# Patient Record
Sex: FEMALE | Race: WHITE | NOT HISPANIC OR LATINO | Employment: UNEMPLOYED | URBAN - METROPOLITAN AREA
[De-identification: names, ages, dates, MRNs, and addresses within clinical notes are randomized per-mention and may not be internally consistent; named-entity substitution may affect disease eponyms.]

---

## 2017-01-05 ENCOUNTER — HOSPITAL ENCOUNTER (EMERGENCY)
Facility: HOSPITAL | Age: 22
Discharge: HOME | End: 2017-01-05
Admitting: EMERGENCY MEDICINE
Payer: COMMERCIAL

## 2017-01-05 VITALS
WEIGHT: 126 LBS | SYSTOLIC BLOOD PRESSURE: 146 MMHG | DIASTOLIC BLOOD PRESSURE: 94 MMHG | TEMPERATURE: 98.6 F | OXYGEN SATURATION: 100 % | RESPIRATION RATE: 16 BRPM | HEART RATE: 100 BPM

## 2017-01-05 DIAGNOSIS — R30.0 DYSURIA: ICD-10-CM

## 2017-01-05 DIAGNOSIS — R42 DIZZINESS: ICD-10-CM

## 2017-01-05 DIAGNOSIS — N39.0 URINARY TRACT INFECTION: Primary | ICD-10-CM

## 2017-01-05 LAB
ANION GAP SERPL CALCULATED.3IONS-SCNC: 9 MMOL/L (ref 4–13)
BACTERIA UR QL AUTO: ABNORMAL /HPF
BASOPHILS # BLD AUTO: 0.02 THOUSANDS/ΜL (ref 0–0.1)
BASOPHILS NFR BLD AUTO: 0 % (ref 0–1)
BILIRUB UR QL STRIP: ABNORMAL
BUN SERPL-MCNC: 12 MG/DL (ref 5–25)
CALCIUM SERPL-MCNC: 9.1 MG/DL (ref 8.3–10.1)
CHLORIDE SERPL-SCNC: 102 MMOL/L (ref 100–108)
CLARITY UR: ABNORMAL
CO2 SERPL-SCNC: 29 MMOL/L (ref 21–32)
COLOR UR: ABNORMAL
CREAT SERPL-MCNC: 0.84 MG/DL (ref 0.6–1.3)
EOSINOPHIL # BLD AUTO: 0.05 THOUSAND/ΜL (ref 0–0.61)
EOSINOPHIL NFR BLD AUTO: 1 % (ref 0–6)
ERYTHROCYTE [DISTWIDTH] IN BLOOD BY AUTOMATED COUNT: 16.2 % (ref 11.6–15.1)
GFR SERPL CREATININE-BSD FRML MDRD: >60 ML/MIN/1.73SQ M
GLUCOSE SERPL-MCNC: 91 MG/DL (ref 65–140)
GLUCOSE UR STRIP-MCNC: ABNORMAL MG/DL
HCG UR QL: NEGATIVE
HCT VFR BLD AUTO: 33.2 % (ref 34.8–46.1)
HGB BLD-MCNC: 10.8 G/DL (ref 11.5–15.4)
HGB UR QL STRIP.AUTO: ABNORMAL
KETONES UR STRIP-MCNC: ABNORMAL MG/DL
LEUKOCYTE ESTERASE UR QL STRIP: ABNORMAL
LYMPHOCYTES # BLD AUTO: 1.33 THOUSANDS/ΜL (ref 0.6–4.47)
LYMPHOCYTES NFR BLD AUTO: 27 % (ref 14–44)
MCH RBC QN AUTO: 24.8 PG (ref 26.8–34.3)
MCHC RBC AUTO-ENTMCNC: 32.5 G/DL (ref 31.4–37.4)
MCV RBC AUTO: 76 FL (ref 82–98)
MONOCYTES # BLD AUTO: 0.54 THOUSAND/ΜL (ref 0.17–1.22)
MONOCYTES NFR BLD AUTO: 11 % (ref 4–12)
NEUTROPHILS # BLD AUTO: 2.98 THOUSANDS/ΜL (ref 1.85–7.62)
NEUTS SEG NFR BLD AUTO: 61 % (ref 43–75)
NITRITE UR QL STRIP: POSITIVE
NON-SQ EPI CELLS URNS QL MICRO: ABNORMAL /HPF
NRBC BLD AUTO-RTO: 0 /100 WBCS
PH UR STRIP.AUTO: 5 [PH] (ref 4.5–8)
PLATELET # BLD AUTO: 319 THOUSANDS/UL (ref 149–390)
PMV BLD AUTO: 10.6 FL (ref 8.9–12.7)
POTASSIUM SERPL-SCNC: 3.8 MMOL/L (ref 3.5–5.3)
PROT UR STRIP-MCNC: >=300 MG/DL
RBC # BLD AUTO: 4.35 MILLION/UL (ref 3.81–5.12)
RBC #/AREA URNS AUTO: ABNORMAL /HPF
SODIUM SERPL-SCNC: 140 MMOL/L (ref 136–145)
SP GR UR STRIP.AUTO: >=1.03 (ref 1–1.03)
UROBILINOGEN UR QL STRIP.AUTO: >=8 E.U./DL
WBC # BLD AUTO: 4.92 THOUSAND/UL (ref 4.31–10.16)
WBC #/AREA URNS AUTO: ABNORMAL /HPF

## 2017-01-05 PROCEDURE — 36415 COLL VENOUS BLD VENIPUNCTURE: CPT | Performed by: PHYSICIAN ASSISTANT

## 2017-01-05 PROCEDURE — 81025 URINE PREGNANCY TEST: CPT | Performed by: PHYSICIAN ASSISTANT

## 2017-01-05 PROCEDURE — 96361 HYDRATE IV INFUSION ADD-ON: CPT

## 2017-01-05 PROCEDURE — 81001 URINALYSIS AUTO W/SCOPE: CPT | Performed by: PHYSICIAN ASSISTANT

## 2017-01-05 PROCEDURE — 87077 CULTURE AEROBIC IDENTIFY: CPT | Performed by: PHYSICIAN ASSISTANT

## 2017-01-05 PROCEDURE — 96374 THER/PROPH/DIAG INJ IV PUSH: CPT

## 2017-01-05 PROCEDURE — 87086 URINE CULTURE/COLONY COUNT: CPT | Performed by: PHYSICIAN ASSISTANT

## 2017-01-05 PROCEDURE — 80048 BASIC METABOLIC PNL TOTAL CA: CPT | Performed by: PHYSICIAN ASSISTANT

## 2017-01-05 PROCEDURE — 85025 COMPLETE CBC W/AUTO DIFF WBC: CPT | Performed by: PHYSICIAN ASSISTANT

## 2017-01-05 PROCEDURE — 87186 SC STD MICRODIL/AGAR DIL: CPT | Performed by: PHYSICIAN ASSISTANT

## 2017-01-05 PROCEDURE — 99283 EMERGENCY DEPT VISIT LOW MDM: CPT

## 2017-01-05 RX ORDER — ONDANSETRON 2 MG/ML
4 INJECTION INTRAMUSCULAR; INTRAVENOUS ONCE
Status: COMPLETED | OUTPATIENT
Start: 2017-01-05 | End: 2017-01-05

## 2017-01-05 RX ORDER — CEPHALEXIN 500 MG/1
500 CAPSULE ORAL 2 TIMES DAILY
Qty: 14 CAPSULE | Refills: 0 | Status: SHIPPED | OUTPATIENT
Start: 2017-01-05 | End: 2017-01-12

## 2017-01-05 RX ADMIN — SODIUM CHLORIDE 1000 ML: 0.9 INJECTION, SOLUTION INTRAVENOUS at 18:28

## 2017-01-05 RX ADMIN — ONDANSETRON 4 MG: 2 INJECTION INTRAMUSCULAR; INTRAVENOUS at 18:28

## 2017-01-07 ENCOUNTER — HOSPITAL ENCOUNTER (EMERGENCY)
Facility: HOSPITAL | Age: 22
Discharge: HOME | End: 2017-01-07
Attending: EMERGENCY MEDICINE | Admitting: EMERGENCY MEDICINE
Payer: COMMERCIAL

## 2017-01-07 VITALS
TEMPERATURE: 98.7 F | HEART RATE: 103 BPM | RESPIRATION RATE: 18 BRPM | OXYGEN SATURATION: 100 % | DIASTOLIC BLOOD PRESSURE: 56 MMHG | SYSTOLIC BLOOD PRESSURE: 98 MMHG | WEIGHT: 125 LBS

## 2017-01-07 DIAGNOSIS — K52.9 GASTROENTERITIS: Primary | ICD-10-CM

## 2017-01-07 LAB
ANION GAP SERPL CALCULATED.3IONS-SCNC: 11 MMOL/L (ref 4–13)
BACTERIA UR CULT: NORMAL
BASOPHILS # BLD MANUAL: 0 THOUSAND/UL (ref 0–0.1)
BASOPHILS NFR MAR MANUAL: 0 % (ref 0–1)
BUN SERPL-MCNC: 8 MG/DL (ref 5–25)
CALCIUM SERPL-MCNC: 8.9 MG/DL (ref 8.3–10.1)
CHLORIDE SERPL-SCNC: 104 MMOL/L (ref 100–108)
CO2 SERPL-SCNC: 24 MMOL/L (ref 21–32)
CREAT SERPL-MCNC: 0.64 MG/DL (ref 0.6–1.3)
EOSINOPHIL # BLD MANUAL: 0 THOUSAND/UL (ref 0–0.4)
EOSINOPHIL NFR BLD MANUAL: 0 % (ref 0–6)
ERYTHROCYTE [DISTWIDTH] IN BLOOD BY AUTOMATED COUNT: 16.2 % (ref 11.6–15.1)
GFR SERPL CREATININE-BSD FRML MDRD: >60 ML/MIN/1.73SQ M
GLUCOSE SERPL-MCNC: 136 MG/DL (ref 65–140)
HCT VFR BLD AUTO: 31.3 % (ref 34.8–46.1)
HGB BLD-MCNC: 10.1 G/DL (ref 11.5–15.4)
HYPERCHROMIA BLD QL SMEAR: PRESENT
LYMPHOCYTES # BLD AUTO: 0.21 THOUSAND/UL (ref 0.6–4.47)
LYMPHOCYTES # BLD AUTO: 2 % (ref 14–44)
MAGNESIUM SERPL-MCNC: 1.2 MG/DL (ref 1.6–2.6)
MCH RBC QN AUTO: 24.5 PG (ref 26.8–34.3)
MCHC RBC AUTO-ENTMCNC: 32.3 G/DL (ref 31.4–37.4)
MCV RBC AUTO: 76 FL (ref 82–98)
MICROCYTES BLD QL AUTO: PRESENT
MONOCYTES # BLD AUTO: 0.21 THOUSAND/UL (ref 0–1.22)
MONOCYTES NFR BLD: 2 % (ref 4–12)
NEUTROPHILS # BLD MANUAL: 9.92 THOUSAND/UL (ref 1.85–7.62)
NEUTS BAND NFR BLD MANUAL: 5 % (ref 0–8)
NEUTS SEG NFR BLD AUTO: 91 % (ref 43–75)
NRBC BLD AUTO-RTO: 0 /100 WBCS
OVALOCYTES BLD QL SMEAR: PRESENT
PLATELET # BLD AUTO: 238 THOUSANDS/UL (ref 149–390)
PLATELET BLD QL SMEAR: ADEQUATE
PMV BLD AUTO: 10.6 FL (ref 8.9–12.7)
POTASSIUM SERPL-SCNC: 3.8 MMOL/L (ref 3.5–5.3)
RBC # BLD AUTO: 4.12 MILLION/UL (ref 3.81–5.12)
SODIUM SERPL-SCNC: 139 MMOL/L (ref 136–145)
TOTAL CELLS COUNTED SPEC: 100
WBC # BLD AUTO: 10.33 THOUSAND/UL (ref 4.31–10.16)

## 2017-01-07 PROCEDURE — 36415 COLL VENOUS BLD VENIPUNCTURE: CPT | Performed by: EMERGENCY MEDICINE

## 2017-01-07 PROCEDURE — 96375 TX/PRO/DX INJ NEW DRUG ADDON: CPT

## 2017-01-07 PROCEDURE — 99283 EMERGENCY DEPT VISIT LOW MDM: CPT

## 2017-01-07 PROCEDURE — 85007 BL SMEAR W/DIFF WBC COUNT: CPT | Performed by: EMERGENCY MEDICINE

## 2017-01-07 PROCEDURE — 96361 HYDRATE IV INFUSION ADD-ON: CPT

## 2017-01-07 PROCEDURE — 85027 COMPLETE CBC AUTOMATED: CPT | Performed by: EMERGENCY MEDICINE

## 2017-01-07 PROCEDURE — 83735 ASSAY OF MAGNESIUM: CPT | Performed by: EMERGENCY MEDICINE

## 2017-01-07 PROCEDURE — 96365 THER/PROPH/DIAG IV INF INIT: CPT

## 2017-01-07 PROCEDURE — 80048 BASIC METABOLIC PNL TOTAL CA: CPT | Performed by: EMERGENCY MEDICINE

## 2017-01-07 RX ORDER — METOCLOPRAMIDE 10 MG/1
10 TABLET ORAL EVERY 6 HOURS
Qty: 120 TABLET | Refills: 0 | Status: SHIPPED | OUTPATIENT
Start: 2017-01-07 | End: 2017-02-06

## 2017-01-07 RX ORDER — METOCLOPRAMIDE HYDROCHLORIDE 5 MG/ML
10 INJECTION INTRAMUSCULAR; INTRAVENOUS ONCE
Status: DISCONTINUED | OUTPATIENT
Start: 2017-01-07 | End: 2017-01-07 | Stop reason: HOSPADM

## 2017-01-07 RX ORDER — ONDANSETRON 4 MG/1
4 TABLET, ORALLY DISINTEGRATING ORAL ONCE
Status: COMPLETED | OUTPATIENT
Start: 2017-01-07 | End: 2017-01-07

## 2017-01-07 RX ORDER — ONDANSETRON 4 MG/1
4 TABLET, ORALLY DISINTEGRATING ORAL EVERY 8 HOURS PRN
Qty: 20 TABLET | Refills: 0 | Status: SHIPPED | OUTPATIENT
Start: 2017-01-07 | End: 2018-08-16

## 2017-01-07 RX ORDER — ONDANSETRON 2 MG/ML
4 INJECTION INTRAMUSCULAR; INTRAVENOUS ONCE
Status: COMPLETED | OUTPATIENT
Start: 2017-01-07 | End: 2017-01-07

## 2017-01-07 RX ADMIN — SODIUM CHLORIDE 1000 ML: 0.9 INJECTION, SOLUTION INTRAVENOUS at 02:52

## 2017-01-07 RX ADMIN — MAGNESIUM SULFATE HEPTAHYDRATE 1 G: 1 INJECTION, SOLUTION INTRAVENOUS at 04:21

## 2017-01-07 RX ADMIN — ONDANSETRON 4 MG: 4 TABLET, ORALLY DISINTEGRATING ORAL at 01:41

## 2017-01-07 RX ADMIN — ONDANSETRON 4 MG: 2 INJECTION INTRAMUSCULAR; INTRAVENOUS at 02:52

## 2017-05-01 ENCOUNTER — ALLSCRIPTS OFFICE VISIT (OUTPATIENT)
Dept: OTHER | Facility: OTHER | Age: 22
End: 2017-05-01

## 2017-05-01 ENCOUNTER — HOSPITAL ENCOUNTER (EMERGENCY)
Facility: HOSPITAL | Age: 22
Discharge: HOME | End: 2017-05-01
Attending: EMERGENCY MEDICINE | Admitting: EMERGENCY MEDICINE
Payer: COMMERCIAL

## 2017-05-01 VITALS
SYSTOLIC BLOOD PRESSURE: 115 MMHG | WEIGHT: 125 LBS | OXYGEN SATURATION: 100 % | RESPIRATION RATE: 16 BRPM | DIASTOLIC BLOOD PRESSURE: 65 MMHG | HEART RATE: 96 BPM | TEMPERATURE: 98 F

## 2017-05-01 DIAGNOSIS — F41.9 ANXIETY: ICD-10-CM

## 2017-05-01 DIAGNOSIS — R31.29 OTHER MICROSCOPIC HEMATURIA: ICD-10-CM

## 2017-05-01 DIAGNOSIS — R42 DIZZINESS AND GIDDINESS: ICD-10-CM

## 2017-05-01 DIAGNOSIS — F41.8 OTHER SPECIFIED ANXIETY DISORDERS: ICD-10-CM

## 2017-05-01 DIAGNOSIS — R00.2 PALPITATIONS: Primary | ICD-10-CM

## 2017-05-01 DIAGNOSIS — R31.29 MICROSCOPIC HEMATURIA: ICD-10-CM

## 2017-05-01 DIAGNOSIS — Z71.6 TOBACCO ABUSE COUNSELING: ICD-10-CM

## 2017-05-01 LAB
ATRIAL RATE: 83 BPM
BACTERIA UR QL AUTO: ABNORMAL /HPF
BILIRUB UR QL STRIP: NEGATIVE
CLARITY UR: CLEAR
COLOR UR: YELLOW
GLUCOSE SERPL-MCNC: 99 MG/DL (ref 65–140)
GLUCOSE UR STRIP-MCNC: NEGATIVE MG/DL
HCG UR QL: NEGATIVE
HGB UR QL STRIP.AUTO: ABNORMAL
KETONES UR STRIP-MCNC: NEGATIVE MG/DL
LEUKOCYTE ESTERASE UR QL STRIP: NEGATIVE
NITRITE UR QL STRIP: NEGATIVE
NON-SQ EPI CELLS URNS QL MICRO: ABNORMAL /HPF
P AXIS: 61 DEGREES
PH UR STRIP.AUTO: 6 [PH] (ref 4.5–8)
PR INTERVAL: 132 MS
PROT UR STRIP-MCNC: NEGATIVE MG/DL
QRS AXIS: 59 DEGREES
QRSD INTERVAL: 78 MS
QT INTERVAL: 340 MS
QTC INTERVAL: 399 MS
RBC #/AREA URNS AUTO: ABNORMAL /HPF
SP GR UR STRIP.AUTO: 1.01 (ref 1–1.03)
T WAVE AXIS: 56 DEGREES
UROBILINOGEN UR QL STRIP.AUTO: 0.2 E.U./DL
VENTRICULAR RATE: 83 BPM
WBC #/AREA URNS AUTO: ABNORMAL /HPF

## 2017-05-01 PROCEDURE — 82948 REAGENT STRIP/BLOOD GLUCOSE: CPT

## 2017-05-01 PROCEDURE — 87086 URINE CULTURE/COLONY COUNT: CPT

## 2017-05-01 PROCEDURE — 81025 URINE PREGNANCY TEST: CPT | Performed by: EMERGENCY MEDICINE

## 2017-05-01 PROCEDURE — 81001 URINALYSIS AUTO W/SCOPE: CPT

## 2017-05-01 PROCEDURE — 81002 URINALYSIS NONAUTO W/O SCOPE: CPT | Performed by: EMERGENCY MEDICINE

## 2017-05-01 PROCEDURE — 93005 ELECTROCARDIOGRAM TRACING: CPT | Performed by: EMERGENCY MEDICINE

## 2017-05-01 PROCEDURE — 87186 SC STD MICRODIL/AGAR DIL: CPT

## 2017-05-01 PROCEDURE — 99285 EMERGENCY DEPT VISIT HI MDM: CPT

## 2017-05-03 LAB
BACTERIA UR CULT: NORMAL
BACTERIA UR CULT: NORMAL

## 2017-05-08 ENCOUNTER — GENERIC CONVERSION - ENCOUNTER (OUTPATIENT)
Dept: OTHER | Facility: OTHER | Age: 22
End: 2017-05-08

## 2017-05-08 ENCOUNTER — HOSPITAL ENCOUNTER (OUTPATIENT)
Dept: ULTRASOUND IMAGING | Facility: HOSPITAL | Age: 22
Discharge: HOME | End: 2017-05-08
Payer: COMMERCIAL

## 2017-05-08 ENCOUNTER — APPOINTMENT (OUTPATIENT)
Dept: LAB | Facility: HOSPITAL | Age: 22
End: 2017-05-08
Payer: COMMERCIAL

## 2017-05-08 DIAGNOSIS — R42 DIZZINESS AND GIDDINESS: ICD-10-CM

## 2017-05-08 DIAGNOSIS — F41.8 OTHER SPECIFIED ANXIETY DISORDERS: ICD-10-CM

## 2017-05-08 DIAGNOSIS — R31.29 OTHER MICROSCOPIC HEMATURIA: ICD-10-CM

## 2017-05-08 LAB
ALBUMIN SERPL BCP-MCNC: 4.5 G/DL (ref 3.5–5)
ALP SERPL-CCNC: 72 U/L (ref 46–116)
ALT SERPL W P-5'-P-CCNC: 14 U/L (ref 12–78)
ANION GAP SERPL CALCULATED.3IONS-SCNC: 12 MMOL/L (ref 4–13)
AST SERPL W P-5'-P-CCNC: 12 U/L (ref 5–45)
BASOPHILS # BLD AUTO: 0.04 THOUSANDS/ΜL (ref 0–0.1)
BASOPHILS NFR BLD AUTO: 0 % (ref 0–1)
BILIRUB SERPL-MCNC: 0.54 MG/DL (ref 0.2–1)
BUN SERPL-MCNC: 5 MG/DL (ref 5–25)
CALCIUM SERPL-MCNC: 9.3 MG/DL (ref 8.3–10.1)
CHLORIDE SERPL-SCNC: 100 MMOL/L (ref 100–108)
CO2 SERPL-SCNC: 25 MMOL/L (ref 21–32)
CREAT SERPL-MCNC: 0.8 MG/DL (ref 0.6–1.3)
EOSINOPHIL # BLD AUTO: 0.09 THOUSAND/ΜL (ref 0–0.61)
EOSINOPHIL NFR BLD AUTO: 1 % (ref 0–6)
ERYTHROCYTE [DISTWIDTH] IN BLOOD BY AUTOMATED COUNT: 17.6 % (ref 11.6–15.1)
GFR SERPL CREATININE-BSD FRML MDRD: >60 ML/MIN/1.73SQ M
GLUCOSE SERPL-MCNC: 94 MG/DL (ref 65–140)
HCT VFR BLD AUTO: 34.7 % (ref 34.8–46.1)
HGB BLD-MCNC: 10.7 G/DL (ref 11.5–15.4)
LYMPHOCYTES # BLD AUTO: 2.85 THOUSANDS/ΜL (ref 0.6–4.47)
LYMPHOCYTES NFR BLD AUTO: 28 % (ref 14–44)
MCH RBC QN AUTO: 22.2 PG (ref 26.8–34.3)
MCHC RBC AUTO-ENTMCNC: 30.8 G/DL (ref 31.4–37.4)
MCV RBC AUTO: 72 FL (ref 82–98)
MONOCYTES # BLD AUTO: 0.85 THOUSAND/ΜL (ref 0.17–1.22)
MONOCYTES NFR BLD AUTO: 8 % (ref 4–12)
NEUTROPHILS # BLD AUTO: 6.27 THOUSANDS/ΜL (ref 1.85–7.62)
NEUTS SEG NFR BLD AUTO: 63 % (ref 43–75)
NRBC BLD AUTO-RTO: 0 /100 WBCS
PLATELET # BLD AUTO: 363 THOUSANDS/UL (ref 149–390)
PMV BLD AUTO: 11.3 FL (ref 8.9–12.7)
POTASSIUM SERPL-SCNC: 3.2 MMOL/L (ref 3.5–5.3)
PROT SERPL-MCNC: 7.8 G/DL (ref 6.4–8.2)
RBC # BLD AUTO: 4.83 MILLION/UL (ref 3.81–5.12)
SODIUM SERPL-SCNC: 137 MMOL/L (ref 136–145)
TSH SERPL DL<=0.05 MIU/L-ACNC: 4.88 UIU/ML (ref 0.36–3.74)
WBC # BLD AUTO: 10.1 THOUSAND/UL (ref 4.31–10.16)

## 2017-05-08 PROCEDURE — 85025 COMPLETE CBC W/AUTO DIFF WBC: CPT

## 2017-05-08 PROCEDURE — 76770 US EXAM ABDO BACK WALL COMP: CPT

## 2017-05-08 PROCEDURE — 80053 COMPREHEN METABOLIC PANEL: CPT

## 2017-05-08 PROCEDURE — 84443 ASSAY THYROID STIM HORMONE: CPT

## 2017-05-08 PROCEDURE — 36415 COLL VENOUS BLD VENIPUNCTURE: CPT

## 2017-05-09 ENCOUNTER — GENERIC CONVERSION - ENCOUNTER (OUTPATIENT)
Dept: OTHER | Facility: OTHER | Age: 22
End: 2017-05-09

## 2017-05-16 ENCOUNTER — HOSPITAL ENCOUNTER (EMERGENCY)
Facility: HOSPITAL | Age: 22
Discharge: HOME | End: 2017-05-16
Attending: EMERGENCY MEDICINE
Payer: COMMERCIAL

## 2017-05-16 VITALS
OXYGEN SATURATION: 99 % | TEMPERATURE: 98.7 F | RESPIRATION RATE: 16 BRPM | WEIGHT: 118 LBS | HEART RATE: 87 BPM | DIASTOLIC BLOOD PRESSURE: 69 MMHG | SYSTOLIC BLOOD PRESSURE: 115 MMHG

## 2017-05-16 DIAGNOSIS — F41.9 ANXIETY: ICD-10-CM

## 2017-05-16 DIAGNOSIS — R55 NEAR SYNCOPE: Primary | ICD-10-CM

## 2017-05-16 LAB
ATRIAL RATE: 61 BPM
BACTERIA UR QL AUTO: ABNORMAL /HPF
BILIRUB UR QL STRIP: NEGATIVE
CLARITY UR: CLEAR
COLOR UR: YELLOW
GLUCOSE UR STRIP-MCNC: NEGATIVE MG/DL
HCG UR QL: NEGATIVE
HGB UR QL STRIP.AUTO: ABNORMAL
KETONES UR STRIP-MCNC: NEGATIVE MG/DL
LEUKOCYTE ESTERASE UR QL STRIP: NEGATIVE
MUCOUS THREADS UR QL AUTO: ABNORMAL
NITRITE UR QL STRIP: NEGATIVE
NON-SQ EPI CELLS URNS QL MICRO: ABNORMAL /HPF
P AXIS: 31 DEGREES
PH UR STRIP.AUTO: 7.5 [PH] (ref 4.5–8)
PR INTERVAL: 132 MS
PROT UR STRIP-MCNC: NEGATIVE MG/DL
QRS AXIS: 61 DEGREES
QRSD INTERVAL: 90 MS
QT INTERVAL: 402 MS
QTC INTERVAL: 404 MS
RBC #/AREA URNS AUTO: ABNORMAL /HPF
SP GR UR STRIP.AUTO: 1.01 (ref 1–1.03)
T WAVE AXIS: 56 DEGREES
UROBILINOGEN UR QL STRIP.AUTO: 0.2 E.U./DL
VENTRICULAR RATE: 61 BPM
WBC #/AREA URNS AUTO: ABNORMAL /HPF

## 2017-05-16 PROCEDURE — 93005 ELECTROCARDIOGRAM TRACING: CPT | Performed by: EMERGENCY MEDICINE

## 2017-05-16 PROCEDURE — 81001 URINALYSIS AUTO W/SCOPE: CPT

## 2017-05-16 PROCEDURE — 81025 URINE PREGNANCY TEST: CPT | Performed by: EMERGENCY MEDICINE

## 2017-05-16 PROCEDURE — 99283 EMERGENCY DEPT VISIT LOW MDM: CPT

## 2017-05-16 PROCEDURE — 81002 URINALYSIS NONAUTO W/O SCOPE: CPT | Performed by: EMERGENCY MEDICINE

## 2017-06-05 ENCOUNTER — ALLSCRIPTS OFFICE VISIT (OUTPATIENT)
Dept: OTHER | Facility: OTHER | Age: 22
End: 2017-06-05

## 2017-06-05 DIAGNOSIS — R79.89 OTHER SPECIFIED ABNORMAL FINDINGS OF BLOOD CHEMISTRY: ICD-10-CM

## 2017-06-05 DIAGNOSIS — D64.9 ANEMIA: ICD-10-CM

## 2017-06-05 LAB
BILIRUB UR QL STRIP: NORMAL
CLARITY UR: NORMAL
COLOR UR: CLEAR
GLUCOSE (HISTORICAL): NORMAL
HGB UR QL STRIP.AUTO: NORMAL
KETONES UR STRIP-MCNC: NORMAL MG/DL
LEUKOCYTE ESTERASE UR QL STRIP: NORMAL
NITRITE UR QL STRIP: NORMAL
PH UR STRIP.AUTO: 6 [PH]
PROT UR STRIP-MCNC: NORMAL MG/DL
SP GR UR STRIP.AUTO: 1.01
UROBILINOGEN UR QL STRIP.AUTO: 0.2

## 2017-06-07 LAB — CULTURE RESULT (HISTORICAL): NORMAL

## 2017-06-16 ENCOUNTER — ALLSCRIPTS OFFICE VISIT (OUTPATIENT)
Dept: OTHER | Facility: OTHER | Age: 22
End: 2017-06-16

## 2017-07-06 ENCOUNTER — ALLSCRIPTS OFFICE VISIT (OUTPATIENT)
Dept: OTHER | Facility: OTHER | Age: 22
End: 2017-07-06

## 2017-07-09 ENCOUNTER — GENERIC CONVERSION - ENCOUNTER (OUTPATIENT)
Dept: OTHER | Facility: OTHER | Age: 22
End: 2017-07-09

## 2017-09-15 ENCOUNTER — ALLSCRIPTS OFFICE VISIT (OUTPATIENT)
Dept: OTHER | Facility: OTHER | Age: 22
End: 2017-09-15

## 2017-09-15 ENCOUNTER — TRANSCRIBE ORDERS (OUTPATIENT)
Dept: ADMINISTRATIVE | Facility: HOSPITAL | Age: 22
End: 2017-09-15

## 2017-09-15 DIAGNOSIS — H53.122 TRANSIENT VISUAL LOSS OF LEFT EYE: ICD-10-CM

## 2017-09-15 DIAGNOSIS — D64.9 ANEMIA: ICD-10-CM

## 2017-09-15 DIAGNOSIS — G44.52 NEW DAILY PERSISTENT HEADACHE: Primary | ICD-10-CM

## 2017-09-15 DIAGNOSIS — R94.6 ABNORMAL RESULTS OF THYROID FUNCTION STUDIES: ICD-10-CM

## 2017-09-15 DIAGNOSIS — G44.52 NEW DAILY PERSISTENT HEADACHE (NDPH): ICD-10-CM

## 2017-09-26 ENCOUNTER — APPOINTMENT (OUTPATIENT)
Dept: LAB | Facility: HOSPITAL | Age: 22
End: 2017-09-26
Payer: COMMERCIAL

## 2017-09-26 ENCOUNTER — GENERIC CONVERSION - ENCOUNTER (OUTPATIENT)
Dept: OTHER | Facility: OTHER | Age: 22
End: 2017-09-26

## 2017-09-26 ENCOUNTER — HOSPITAL ENCOUNTER (OUTPATIENT)
Dept: MRI IMAGING | Facility: HOSPITAL | Age: 22
Discharge: HOME | End: 2017-09-26
Payer: COMMERCIAL

## 2017-09-26 DIAGNOSIS — H53.122 TRANSIENT VISUAL LOSS OF LEFT EYE: ICD-10-CM

## 2017-09-26 DIAGNOSIS — G44.52 NEW DAILY PERSISTENT HEADACHE: ICD-10-CM

## 2017-09-26 DIAGNOSIS — G44.52 NEW DAILY PERSISTENT HEADACHE (NDPH): ICD-10-CM

## 2017-09-26 DIAGNOSIS — D64.9 ANEMIA: ICD-10-CM

## 2017-09-26 DIAGNOSIS — R94.6 ABNORMAL RESULTS OF THYROID FUNCTION STUDIES: ICD-10-CM

## 2017-09-26 LAB
ALBUMIN SERPL BCP-MCNC: 4.1 G/DL (ref 3.5–5)
ALP SERPL-CCNC: 84 U/L (ref 46–116)
ALT SERPL W P-5'-P-CCNC: 41 U/L (ref 12–78)
ANION GAP SERPL CALCULATED.3IONS-SCNC: 5 MMOL/L (ref 4–13)
AST SERPL W P-5'-P-CCNC: 18 U/L (ref 5–45)
BILIRUB SERPL-MCNC: 0.13 MG/DL (ref 0.2–1)
BUN SERPL-MCNC: 11 MG/DL (ref 5–25)
CALCIUM SERPL-MCNC: 9.2 MG/DL (ref 8.3–10.1)
CHLORIDE SERPL-SCNC: 103 MMOL/L (ref 100–108)
CO2 SERPL-SCNC: 31 MMOL/L (ref 21–32)
CREAT SERPL-MCNC: 0.75 MG/DL (ref 0.6–1.3)
CRP SERPL QL: <3 MG/L
ERYTHROCYTE [SEDIMENTATION RATE] IN BLOOD: 4 MM/HOUR (ref 0–20)
FERRITIN SERPL-MCNC: 4 NG/ML (ref 8–388)
FOLATE SERPL-MCNC: >20 NG/ML (ref 3.1–17.5)
GFR SERPL CREATININE-BSD FRML MDRD: 114 ML/MIN/1.73SQ M
GLUCOSE P FAST SERPL-MCNC: 98 MG/DL (ref 65–99)
IRON SERPL-MCNC: 24 UG/DL (ref 50–170)
POTASSIUM SERPL-SCNC: 4 MMOL/L (ref 3.5–5.3)
PROT SERPL-MCNC: 7.6 G/DL (ref 6.4–8.2)
SODIUM SERPL-SCNC: 139 MMOL/L (ref 136–145)
T4 SERPL-MCNC: 10 UG/DL (ref 4.7–13.3)
TRANSFERRIN SERPL-MCNC: 325 MG/DL (ref 200–400)
TSH SERPL DL<=0.05 MIU/L-ACNC: 6.44 UIU/ML (ref 0.36–3.74)
VIT B12 SERPL-MCNC: 545 PG/ML (ref 100–900)

## 2017-09-26 PROCEDURE — 84466 ASSAY OF TRANSFERRIN: CPT

## 2017-09-26 PROCEDURE — 82746 ASSAY OF FOLIC ACID SERUM: CPT

## 2017-09-26 PROCEDURE — 80053 COMPREHEN METABOLIC PANEL: CPT

## 2017-09-26 PROCEDURE — 36415 COLL VENOUS BLD VENIPUNCTURE: CPT

## 2017-09-26 PROCEDURE — 84443 ASSAY THYROID STIM HORMONE: CPT

## 2017-09-26 PROCEDURE — 85652 RBC SED RATE AUTOMATED: CPT

## 2017-09-26 PROCEDURE — 70551 MRI BRAIN STEM W/O DYE: CPT

## 2017-09-26 PROCEDURE — 83540 ASSAY OF IRON: CPT

## 2017-09-26 PROCEDURE — 82607 VITAMIN B-12: CPT

## 2017-09-26 PROCEDURE — 86140 C-REACTIVE PROTEIN: CPT

## 2017-09-26 PROCEDURE — 84436 ASSAY OF TOTAL THYROXINE: CPT

## 2017-09-26 PROCEDURE — 82728 ASSAY OF FERRITIN: CPT

## 2017-09-27 ENCOUNTER — GENERIC CONVERSION - ENCOUNTER (OUTPATIENT)
Dept: OTHER | Facility: OTHER | Age: 22
End: 2017-09-27

## 2017-10-09 ENCOUNTER — ALLSCRIPTS OFFICE VISIT (OUTPATIENT)
Dept: OTHER | Facility: OTHER | Age: 22
End: 2017-10-09

## 2017-10-10 NOTE — PROGRESS NOTES
Assessment  1  New daily persistent headache (339 42) (G44 52)   2  Hypothyroid (244 9) (E03 9)   3  Anemia (285 9) (D64 9)   4  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)    Plan  Anemia    · Ferrous Sulfate 325 (65 Fe) MG Oral Tablet; Take 1 tablet twice daily   · Call [phone] if: You have nausea and vomiting that lasts longer than 8 hours ;  Status:Complete;   Done: 55TDW2499 02:52PM   · Call [phone] if: You see any blood in the stool ; Status:Complete;   Done:  32QMT9749 02:52PM   · Call [phone] if: Your bowel movements are hard, difficult to push out, or if several  days have gone by without a bowel movement ; Status:Complete;   Done: 47LEV6808  02:52PM   · Call [phone] if: Your skin, lips, or fingernails appear pale ; Status:Complete;    Done: 56AQQ1351 02:52PM   · Call 911 if: You have pain in your chest ; Status:Complete;   Done: 73MVG8629 02:52PM   · Do not take aspirin or anti-inflammatory medicines ; Status:Complete;   Done:  63ZDM5819 02:52PM   · Eat a diet high in iron ; Status:Complete;   Done: 43PYA6583 02:52PM  Hypothyroid    · Levothyroxine Sodium 50 MCG Oral Tablet; TAKE 1 TABLET DAILY   · (1) T4, FREE; Status:Active; Requested for:50Kcl2717;    · (1) TSH; Status:Active; Requested for:81Hyv7005;    · Call [phone] if: The symptoms are not better in 7 days ; Status:Complete;   Done:  64IVK6279 02:52PM   · Call [phone] if: You gain or lose over 10 pounds without a change in your diet ;  Status:Complete;   Done: 71EES4185 02:52PM   · Call [phone] if: You have symptoms of anxiety ; Status:Complete;   Done:  12DAP0690 02:52PM   · Call [phone] if: You lose weight without trying to ; Status:Complete;   Done:  15ZFO9272 02:52PM   · Call 911 if: You are having trouble staying awake ; Status:Complete;   Done: 05FSV3876  02:52PM   · Call 911 if:  You experience a new kind of chest pain (angina) or pressure ;  Status:Complete;   Done: 79CHC1482 02:52PM   · Call 911 if: You experience a seizure ; Status:Complete;   Done: 87EYM9832 02:52PM   · Call 911 if: You have fainted or passed out ; Status:Complete;   Done: 04YLW2507  02:52PM   · Seek Immediate Medical Attention if: You are feeling short of breath ; Status:Complete;    Done: 41EKU6865 02:52PM   · Seek Immediate Medical Attention if: You feel your heart is beating very fast or skipping  beats ; Status:Complete;   Done: 73TFW1364 02:52PM   · Seek Immediate Medical Attention if: You have signs of too much thyroid hormone ;  Status:Complete;   Done: 52SHR4759 02:52PM   · Begin a limited exercise program ; Status:Complete;   Done: 82OWM8200 02:52PM   · Eat no more than 30 grams of fat per day ; Status:Complete;   Done: 98KXD5195 02:52PM   · Keep a diary of when and what you eat ; Status:Complete;   Done: 71UUE0948 02:52PM   · Some eating tips that can help you lose weight ; Status:Complete;   Done: 90HPM1229  02:52PM   · We recommend that you bring your body mass index down to 26 ; Status:Complete;    Done: 87YTJ0102 02:52PM  Ophthalmic migraine    · Avoid activities that cause or worsen the pain ; Status:Complete;   Done: 44MVH0146  02:52PM   · Avoid exposure to cigarette smoke ; Status:Complete;   Done: 96RDB2560 02:52PM   · Call [phone] if: The symptoms seem worse ; Status:Complete;   Done:  03JWQ8339 02:52PM   · Call [phone] if: You become dizzy or lightheaded, especially when you stand up  after sitting for awhile ; Status:Complete;   Done: 30CEI0891 02:52PM   · Call [phone] if: You get a headache that does not go away with your usual  treatment ; Status:Complete;   Done: 14AJL5459 02:52PM   · Call [phone] if: Your headaches lead to vomiting ; Status:Complete;   Done:  51UDS0288 02:52PM   · Call 911 if:  You develop double vision (see two of everything) ; Status:Complete;   Done:  43QZZ6099 02:52PM   · Call 911 if: You have any symptoms of a stroke ; Status:Complete;   Done: 25MVB3494  02:52PM   · Seek Immediate Medical Attention if: You have a severe headache that will not go away ;  Status:Complete;   Done: 74ZIG5944 02:52PM   · Seek Immediate Medical Attention if: You lose your vision for a short period of time ;  Status:Complete;   Done: 49MUF0082 02:52PM   · Seek Immediate Medical Attention if: Your temperature is greater than 103F ;  Status:Complete;   Done: 38IKG2099 02:52PM  Unlinked    · Rizatriptan Benzoate 10 MG Oral Tablet Disintegrating; TAKE 1 TABLET AT ONSET  OF HEADACHE  MAY REPEAT EVERY 2 HOURS AS NEEDED  MAXIMUM 3 TABLETS IN  24 HOURS    I will see her after she gets her lab work done in 11 weeks  She is to call the office if symptoms do not improve or if they get worse  We will recheck her iron and CBC in 3 months  Chief Complaint  follow up headaches  review blood work,[facility]      History of Present Illness  Patient is a 19-year-old female presented to the office for follow-up after her headaches and to review recent MRI and lab work  She is only had 2 bad optic migraines since her last visit, the butalbital did help somewhat but not completely  She does continue to have almost daily headaches  These are different than the optic migraines  The patient is being seen for follow-up of migraine headache and chronic daily headache  The patient reports Migraines improved, still getting daily headaches  Interval symptoms:  denies nausea,-denies vomiting,-denies photophobia,-denies phonophobia,-denies paresthesias,-denies localized weakness,-improved aura-and-improved scotoma  Associated symptoms: vertigo,-lightheadedness-and-Patient describes her daily headache as a throbbing that is generalized  The ophthalmic headache is more left-sided, deep, aching , but-no nasal congestion-and-no neck pain  Active Problems  1  Anemia (285 9) (D64 9)   2  Anxiety associated with depression (300 4) (F41 8)   3  New daily persistent headache (339 42) (G44 52)   4  Transient visual loss of left eye (368 12) (H53 122)    Past Medical History  1  History of Abnormal thyroid stimulating hormone (TSH) level (790 6) (R94 6)   2  History of Acute tonsillitis, unspecified etiology (463) (J03 90)   3  History of Anxiety (300 00) (F41 9)   4  History of Dysphagia, oropharyngeal phase (787 22) (R13 12)   5  History of depression (V11 8) (Z86 59)   6  History of dysuria (V13 00) (Z87 898)   7  History of fatigue (V13 89) (Z87 898)   8  History of hematuria (V13 09) (Z87 448)   9  History of urinary frequency (V13 09) (Z87 898)   10  History of Insect bite, multiple (919 4,E906 4) (W57 XXXA)   11  History of Microscopic hematuria (599 72) (R31 29)   12  History of Yeast vaginitis (112 1) (B37 3)    Family History  Mother    1  Family history of alcoholism (V17 0) (Z81 1)   2  Family history of hepatic cirrhosis (V18 59) (Z83 79)  Maternal Grandfather    3  Family history of alcoholism (V17 0) (Z81 1)   4  Family history of hepatic cirrhosis (V18 59) (Z83 79)    The family history was reviewed and updated today  Social History   · Always uses seat belt   · Current every day smoker (305 1) (F17 200)   · Denied: History of Drug Use   · Moderate alcohol use  The social history was reviewed and updated today  The social history was reviewed and is unchanged  Current Meds   1  Butalbital-APAP-Caffeine -40 MG Oral Capsule; 1 tablet every 4-6 hrs as needed   for migraine; Therapy: 93Tgo5058 to (Last Rx:20Fco1238)  Requested for: 30Mok4921 Ordered   2  Multiple Vitamins Oral Tablet; Take 1 daily; Therapy: 78Xck9409 to (Last Rx:99Xfr4050) Ordered    The medication list was reviewed and updated today  Allergies  1   No Known Drug Allergies    Vitals  Vital Signs    Recorded: 88QXY1720 50:07CP   Systolic 073   Diastolic 70   Height 5 ft    Weight 115 lb    BMI Calculated 22 46   BSA Calculated 1 48     Physical Exam    Constitutional   General appearance: No acute distress, well appearing and well nourished  Eyes   Conjunctiva and lids: No swelling, erythema or discharge  Pupils and irises: Equal, round and reactive to light  Ears, Nose, Mouth, and Throat   External inspection of ears and nose: Normal     Otoscopic examination: Tympanic membranes translucent with normal light reflex  Canals patent without erythema  Nasal mucosa, septum, and turbinates: Normal without edema or erythema  Oropharynx: Normal with no erythema, edema, exudate or lesions  Pulmonary   Respiratory effort: No increased work of breathing or signs of respiratory distress  Auscultation of lungs: Clear to auscultation  Cardiovascular   Palpation of heart: Normal PMI, no thrills  Auscultation of heart: Normal rate and rhythm, normal S1 and S2, without murmurs  Examination of extremities for edema and/or varicosities: Normal     Carotid pulses: Normal     Abdomen   Abdomen: Non-tender, no masses  Liver and spleen: No hepatomegaly or splenomegaly  Lymphatic   Palpation of lymph nodes in neck: No lymphadenopathy  Musculoskeletal   Gait and station: Normal     Digits and nails: Normal without clubbing or cyanosis  Inspection/palpation of joints, bones, and muscles: Normal     Skin   Skin and subcutaneous tissue: Normal without rashes or lesions  Neurologic   Cranial nerves: Cranial nerves 2-12 intact  Reflexes: 2+ and symmetric  Sensation: No sensory loss      Psychiatric   Orientation to person, place, and time: Normal     Mood and affect: Normal          Results/Data  (1) COMPREHENSIVE METABOLIC PANEL 07NFZ5836 93:59PW Veronica Vergara Order Number: [id number]     Test Name Result Flag Reference   SODIUM 139 mmol/L  136-145   POTASSIUM 4 0 mmol/L  3 5-5 3   CHLORIDE 103 mmol/L  100-108   CARBON DIOXIDE 31 mmol/L  21-32   ANION GAP (CALC) 5 mmol/L  4-13   BLOOD UREA NITROGEN 11 mg/dL  5-25   CREATININE 0 75 mg/dL  0 60-1 30   Standardized to [facility] reference method   CALCIUM 9 2 mg/dL  8 3-10 1   BILI, TOTAL 0 13 mg/dL L 0 20-1 00   ALK PHOSPHATAS 84 U/L     ALT (SGPT) 41 U/L  12-78   Specimen collection should occur prior to Sulfasalazine administration due to the potential for falsely depressed results  AST(SGOT) 18 U/L  5-45   Specimen collection should occur prior to Sulfasalazine administration due to the potential for falsely depressed results  ALBUMIN 4 1 g/dL  3 5-5 0   TOTAL PROTEIN 7 6 g/dL  6 4-8 2   eGFR 114 ml/min/1 73sq m     [facility] Disease Education Program recommendations are as follows:  GFR calculation is accurate only with a steady state creatinine  Chronic Kidney disease less than 60 ml/min/1 73 sq  meters  Kidney failure less than 15 ml/min/1 73 sq  meters  GLUCOSE FASTING 98 mg/dL  65-99   Specimen collection should occur prior to Sulfasalazine administration due to the potential for falsely depressed results  Specimen collection should occur prior to Sulfapyridine administration due to the potential for falsely elevated results  (1) C-REACTIVE PROTEIN 85BGS6941 08:36AM [employer]    Order Number: [id number]     Test Name Result Flag Reference   C-REACT PROTEIN <3 0 mg/L  <3 0     (1) SED RATE 84IXR8774 08:36AM [facility] Order Number: [id number]     Test Name Result Flag Reference   SED RATE 4 mm/hour  0-20     (1) TSH 59IEM9242 08:36AM [employer]    Order Number: [id number]     Test Name Result Flag Reference   TSH 6 436 uIU/mL H 0 358-3 740   Patients undergoing fluorescein dye angiography may retain small amounts of fluorescein in the body for 48-72 hours post procedure  Samples containing fluorescein can produce falsely depressed TSH values  If the patient had this procedure,a specimen should be resubmitted post fluorescein clearance            The recommended reference ranges for TSH during pregnancy are as follows:  First trimester 0 1 to 2 5 uIU/mL  Second trimester  0 2 to 3 0 uIU/mL  Third trimester 0 3 to 3 0 uIU/m     (1) THYROXINE 45Qlp0503 08:36AM [employer] [place] Order Number: [id number]     Test Name Result Flag Reference   T4 TOTAL 10 0 ug/dL  4 7-13 3     (1) FERRITIN 29HFT5506 08:36AM [employer] [place] Order Number: [id number]     Test Name Result Flag Reference   FERRITIN 4 ng/mL L 8-388     (1) TRANSFERRIN 53YJK1122 08:36AM [employer] [place] Order Number: [id number]     Test Name Result Flag Reference   TRANSFERRIN 325 mg/dL  200-400     (1) IRON 30EUF7427 08:36AM [employer] [place] Order Number: [id number]     Test Name Result Flag Reference   IRON 24 ug/dL L    Patients treated with metal-binding drugs (ie  Deferoxamine) may have depressed iron values  (1) VITAMIN B12 26Sep2017 08:36AM [employer] [place] Order Number: [id number]     Test Name Result Flag Reference   VITAMIN B12 545 pg/mL  100-900     (1) FOLATE 83ISC6573 08:36AM [employer] [place] Order Number: [id number]     Test Name Result Flag Reference   FOLATE >20 0 ng/mL H 3 1-17 5     * MRI BRAIN WO CONTRAST 83APN1854 07:49AM Lalito Phillips     Test Name Result Flag Reference   MRI BRAIN WO CONTRAST (Report)     MRI BRAIN WITHOUT CONTRAST     INDICATION: Migraines, 1-2 months     COMPARISON:  None  TECHNIQUE: Sagittal T1, axial T2, axial FLAIR, axial T1, axial Gradient and axial diffusion imaging  IMAGE QUALITY: Diagnostic  FINDINGS:     BRAIN PARENCHYMA: No acute disease  There is no acute ischemia, intracranial mass, mass effect or edema  No indication of pathologic hemosiderin deposition  Tiny nonspecific focus of high signal in the subinsular parenchyma on the left  These have been described in patients with complicated migraine  VENTRICLES: The ventricles are normal in size and contour       SELLA AND PITUITARY GLAND: Normal      ORBITS: Normal      PARANASAL SINUSES: Normal      VASCULATURE: Evaluation of the major intracranial vasculature demonstrates appropriate flow voids  CALVARIUM AND SKULL BASE: Normal      EXTRACRANIAL SOFT TISSUES: Normal        IMPRESSION:     No acute disease  No mass or edema  Tiny, nonspecific solitary focus of high signal subinsular white matter  Workstation performed: [id number]     Signed by: Yaya Longo MD   9/26/17     Health Management  Current every day smoker   *VB - Smoking and Tobacco Cessation Counseling 3-10 minutes; every 1 year; Next Due:  64RMK0704; Overdue    Future Appointments    Date/Time Provider Specialty Site   11/10/2017 07:00 AM Prosper Pelletier MD Neurology [place] [address]   10/16/2017 02:15 PM CHAGO Hinton   Urology [facility] [place]     Signatures   Electronically signed by : Roxana Deluna DO; Oct  9 2017  2:53PM EST                       (Author)

## 2017-10-16 ENCOUNTER — ALLSCRIPTS OFFICE VISIT (OUTPATIENT)
Dept: OTHER | Facility: OTHER | Age: 22
End: 2017-10-16

## 2017-10-16 ENCOUNTER — APPOINTMENT (OUTPATIENT)
Dept: LAB | Facility: HOSPITAL | Age: 22
End: 2017-10-16
Attending: UROLOGY
Payer: COMMERCIAL

## 2017-10-16 DIAGNOSIS — R31.29 OTHER MICROSCOPIC HEMATURIA: ICD-10-CM

## 2017-10-16 DIAGNOSIS — N39.0 URINARY TRACT INFECTION: ICD-10-CM

## 2017-10-16 LAB
BILIRUB UR QL STRIP: NEGATIVE
BILIRUB UR QL STRIP: NORMAL
CLARITY UR: CLEAR
CLARITY UR: NORMAL
COLOR UR: YELLOW
COLOR UR: YELLOW
GLUCOSE (HISTORICAL): NORMAL
GLUCOSE UR STRIP-MCNC: NEGATIVE MG/DL
HGB UR QL STRIP.AUTO: NEGATIVE
HGB UR QL STRIP.AUTO: NORMAL
KETONES UR STRIP-MCNC: NEGATIVE MG/DL
KETONES UR STRIP-MCNC: NORMAL MG/DL
LEUKOCYTE ESTERASE UR QL STRIP: NEGATIVE
LEUKOCYTE ESTERASE UR QL STRIP: NORMAL
NITRITE UR QL STRIP: NEGATIVE
NITRITE UR QL STRIP: NORMAL
PH UR STRIP.AUTO: 5 [PH]
PH UR STRIP.AUTO: 6 [PH] (ref 4.5–8)
PROT UR STRIP-MCNC: NEGATIVE MG/DL
PROT UR STRIP-MCNC: NORMAL MG/DL
SP GR UR STRIP.AUTO: 1.02
SP GR UR STRIP.AUTO: 1.02 (ref 1–1.03)
UROBILINOGEN UR QL STRIP.AUTO: 0.2 E.U./DL
UROBILINOGEN UR QL STRIP.AUTO: NORMAL

## 2017-10-16 PROCEDURE — 81003 URINALYSIS AUTO W/O SCOPE: CPT

## 2017-10-16 PROCEDURE — 87086 URINE CULTURE/COLONY COUNT: CPT

## 2017-10-17 ENCOUNTER — ALLSCRIPTS OFFICE VISIT (OUTPATIENT)
Dept: OTHER | Facility: OTHER | Age: 22
End: 2017-10-17

## 2017-10-17 LAB — BACTERIA UR CULT: NORMAL

## 2017-10-17 NOTE — CONSULTS
Assessment  1  Recurrent UTI (599 0) (N39 0)   2  Microscopic hematuria (599 72) (R31 29)   3  History of Thyroid trouble (246 9) (E07 9)   4  History of Mental health problem (V40 9) (F48 9)   5  Family history of hypertension (V17 49) (Z82 49) : Father   6  Never a smoker   7  No alcohol use   8  Non-smoker (V49 89) (Z78 9)    Plan   Microscopic hematuria, Recurrent UTI    · Urine Dip Non-Automated- POC; Status:Complete - Retrospective By Protocol  Authorization;   Done: 54FDQ2411 02:17PM   Performed: In Office; Due:16Oct2018; Last Updated By:Juanito Jose; 10/16/2017 2:19:20 PM;Ordered; For:Microscopic hematuria, Recurrent UTI; Ordered By:Scotty Kramer;   · (1) URINALYSIS (will reflex a microscopy if leukocytes, occult blood, protein or nitrites are  not within normal limits); Status:Active - Retrospective By Protocol Authorization; Requested UVC:[id number];    [facility]; Due:16Oct2018; Last Updated By:Juanito Jose; 10/16/2017 3:06:18 PM;Ordered; For:Microscopic hematuria, Recurrent UTI; Ordered By:Scotty Kramer;   · (1) URINE CULTURE; Source:Urine, Clean Catch; Status:Active - Retrospective By  Protocol Authorization; Requested GUP:[id number];    [facility]form[facility]; Due:16Oct2018; Last Updated By:Juanito Jose; 10/16/2017 3:06:18 PM;Ordered; For:Microscopic hematuria, Recurrent UTI; Ordered By:Scotty Kramer;  Recurrent UTI    · Follow-up visit in 1 month Evaluation and Treatment  Follow-up  Status: Hold For -  Scheduling  Requested for: 38KDY8156   Ordered; For: Recurrent UTI; Ordered By: Vinh Goldstein Performed:  Due: 13ZAA4043    Measure Post Void Residual - POC; Status:Active - Perform Order; Requested WWI:63MSD3577;   Perform: In Office; Due:16Oct2018; Ordered; For:Recurrent UTI; Ordered By:Dolores Kramer [place]; Discussion/Summary  Discussion Summary:   We discussed her symptoms and recent infections   I would like to check urinalysis and culture to evaluate her status now  We also talked about increasing her fluid intake with water and decreasing her iced tea intake  I recommended GYN evaluation as she has not had 1 in some time  She will follow up in about 1 month to evaluate her progress  She should notify us if she has any further symptoms we will treat according to her cultures  Will also check a postvoid residual at her next visit  Goals and Barriers: The patent has the current Barriers: None  Medication SE Review and Pt Understands Tx: The treatment plan was reviewed with the patient/guardian  The patient/guardian understands and agrees with the treatment plan      Chief Complaint  Chief Complaint Free Text Note Form: Patient presents for recurrent UTIs, microhematuria      History of Present Illness  HPI: Patient is a 76-year-old woman complaining of recurrent urinary tract infections  She reports burning and urgency of urination if she is not well hydrated which lasts for a few days  She notes that with increasing hydration her symptoms improved  She has also been treated with antibiotics in the past  There are urine cultures positive for E coli  She mostly drinks iced tea and some soda and coffee  Not much water  is sexually active and does void afterwards  She does not think sexual activity is related to her infections  Review of Systems  Complete-Female Urology:   Constitutional: No fever, no chills, feels well, no tiredness, no recent weight gain or weight loss  Respiratory: No complaints of shortness of breath, no wheezing, no cough, no SOB on exertion, no orthopnea, no PND  Cardiovascular: No complaints of slow heart rate, no fast heart rate, no chest pain, no palpitations, no leg claudication, no lower extremity edema  Gastrointestinal: No complaints of abdominal pain, no constipation, no nausea or vomiting, no diarrhea, no bloody stools     Genitourinary: dysuria,-- feelings of urinary urgency-- (Soemtimes ),-- Empty sensation-- and-- stream quality good, but-- as noted in HPI,-- no urinary hesitancy-- and-- no hematuria--    The patient presents with complaints of nocturia (Sometimes )  Musculoskeletal: No complaints of arthralgias, no myalgias, no joint swelling or stiffness, no limb pain or swelling  Integumentary: No complaints of skin rash or lesions, no itching, no skin wounds, no breast pain or lump  Hematologic/Lymphatic: No complaints of swollen glands, no swollen glands in the neck, does not bleed easily, does not bruise easily  Neurological: No complaints of headache, no confusion, no convulsions, no numbness, no dizziness or fainting, no tingling, no limb weakness, no difficulty walking  ROS Reviewed:   ROS reviewed  Active Problems  1  Anemia (285 9) (D64 9)   2  Anxiety associated with depression (300 4) (F41 8)   3  Hypothyroid (244 9) (E03 9)   4  Microscopic hematuria (599 72) (R31 29)   5  New daily persistent headache (339 42) (G44 52)   6  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)   7  Recurrent UTI (599 0) (N39 0)   8  Transient visual loss of left eye (368 12) (H53 122)    Past Medical History  1  History of Abnormal thyroid stimulating hormone (TSH) level (790 6) (R94 6)   2  History of Acute tonsillitis, unspecified etiology (463) (J03 90)   3  History of Anxiety (300 00) (F41 9)   4  History of Dysphagia, oropharyngeal phase (787 22) (R13 12)   5  History of depression (V11 8) (Z86 59)   6  History of dysuria (V13 00) (Z87 898)   7  History of fatigue (V13 89) (Z87 898)   8  History of hematuria (V13 09) (Z87 448)   9  History of urinary frequency (V13 09) (Z87 898)   10  History of Insect bite, multiple (919 4,E906 4) (W57 XXXA)   11  History of Mental health problem (V40 9) (F48 9)   12  History of Thyroid trouble (246 9) (E07 9)   13  History of Yeast vaginitis (112 1) (B37 3)  Active Problems And Past Medical History Reviewed:    The active problems and past medical history were reviewed and updated today  Surgical History  Surgical History Reviewed: The surgical history was reviewed and updated today  Family History  Mother    1  Family history of alcoholism (V17 0) (Z81 1)   2  Family history of hepatic cirrhosis (V18 59) (Z83 79)  Father    3  Family history of hypertension (V17 49) (Z82 49)  Maternal Grandfather    4  Family history of alcoholism (V17 0) (Z81 1)   5  Family history of hepatic cirrhosis (V18 59) (Z83 79)  Family History Reviewed: The family history was reviewed and updated today  Social History   · Always uses seat belt   · Current every day smoker (305 1) (F17 200)   · Denied: History of Drug Use   · Moderate alcohol use   · Never a smoker   · No alcohol use   · Non-smoker (V49 89) (Z78 9)  Social History Reviewed: The social history was reviewed and updated today  Current Meds   1  Butalbital-APAP-Caffeine -40 MG Oral Capsule; 1 tablet every 4-6 hrs as needed   for migraine; Therapy: 80Lde7134 to (Last Rx:21Mzu1629)  Requested for: 93Sjo6048 Ordered   2  Ferrous Sulfate 325 (65 Fe) MG Oral Tablet; Take 1 tablet twice daily; Therapy: 34VWJ7408 to (Last Rx:52Hkp5227)  Requested for: 52XSP4564 Ordered   3  Levothyroxine Sodium 50 MCG Oral Tablet; TAKE 1 TABLET DAILY; Therapy: 87QDD3469 to (Evaluate:07Apr2018)  Requested for: 77DJX7749; Last   Rx:64Huv9355 Ordered   4  Multiple Vitamins Oral Tablet; Take 1 daily; Therapy: 33Thh7680 to (Last Rx:52Mlo9650) Ordered   5  Rizatriptan Benzoate 10 MG Oral Tablet Disintegrating; TAKE 1 TABLET AT ONSET OF   HEADACHE  MAY REPEAT EVERY 2 HOURS AS NEEDED  MAXIMUM 3 TABLETS IN 24   HOURS; Therapy: 39GMR8045 to (Last Rx:59Kcd6463)  Requested for: 89DLE1650 Ordered  Medication List Reviewed: The medication list was reviewed and updated today  Allergies  1   No Known Drug Allergies    Vitals  Vital Signs    Recorded: 00AUM6372 02:17PM   Heart Rate 70   Systolic 798   Diastolic 68   Height 5 ft    Weight 113 lb 6 oz   BMI Calculated 22 14   BSA Calculated 1 47     Physical Exam    Constitutional   General appearance: No acute distress, well appearing and well nourished  Pulmonary   Respiratory effort: No increased work of breathing or signs of respiratory distress  Cardiovascular   Examination of extremities for edema and/or varicosities: Normal     Abdomen   Abdomen: Non-tender, no masses  Musculoskeletal   Gait and station: Normal     Skin   Skin and subcutaneous tissue: Normal without rashes or lesions  Neurologic   Sensation: No sensory loss  Additional Exam:   no CVA tenderness        Results/Data  Urine Dip Non-Automated- POC 78SDB2915 02:17PM [facility]     Test Name Result Flag Reference   Color Yellow     Clarity Transparent     Leukocytes -     Nitrite -     Blood trace     Bilirubin -     Urobilinogen -     Protein -     Ph 5 0     Specific Gravity 1 025     Ketone *     Glucose *         Future Appointments    Date/Time Provider Specialty Site   12/21/2017 03:30 PM Adonay Monteiro DO [address]   10/17/2017 11:00 AM Juan Francisco Aguirre MD Neurology [place] [address]   12/07/2017 01:15 PM Jd Olivo Urology [address]     Signatures   Electronically signed by : CHAGO Morales ; Oct 16 2017  4:01PM EST                       (Author)

## 2017-10-19 NOTE — CONSULTS
Assessment  1  Anemia (285 9) (D64 9)   2  Anxiety associated with depression (300 4) (F41 8)   3  Hypothyroid (244 9) (E03 9)   4  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)   5  Chronic tension-type headache, intractable (339 12) (A27 934)    Discussion/Summary  Discussion Summary:   Tension type headacheMRI brain not acuteMild iron deficiency anemia as well as worse depression and anxiety could be contributory  After chart review, discussed that she was recommended Ferrous sulfate 325 mg BID per PCP  Discussed taking MagOx 250 mg daily increase to 500 mg after one week, and Vitamin B2 daily, and this could be helpful  If this is not helpful, we can try other medication such as gabapentin or venlafaxine  Abortive therapy: none- rare for headaches to be moderate to severe  Anemia and iron deficiency- further work up and treatment per PCPher headaches will improve with anemia treatment, and/or better control of depression /anxiety- states she saw a therapist and will likely follow  Counseling Documentation With Imm: The patient was counseled regarding  Headache [facility]:   The patient was counseled regarding;   Discussed side effects of all medications prescribed today to the patient in detail  Patient education was completed today and we also discussed precautions for rebound headaches  --    When patient has a moderate to severe headache, they should seek rest, initiate relaxation and apply cold compresses to the head  Also recommended to the patient :  1  Maintain regular sleep schedule -- 2  Limit over the counter medications  (No more than 3 times a week)  -- 3  Maintain headache diary  -- 4  Limit caffeine to 1-2 cups a day or less  -- 5  Avoid dietary trigger  (list given to the patient and reviewed with them)  -- 6  Patient is to have regular frequent meals to prevent headache onset  Chief Complaint  Chief Complaint Free Text Note Form: Patient presents for a consultation for headaches        History of Present Illness  HPI: Ms Renato Avalos is a pleasant 25 yo female presenting in consultation for headaches, starting a few months ago with no specific inciting etiology  She then, however goes on to tell me that her recent blood work by her PCP, showed her to be anemic, Hgb 10 7 with a low iron of 24 on chart review  She tells me she also recently diagnosed with underactive thyroid TSH 6 4, and has just started thyroid medication  the headaches occur almost every day, tells me she has them in the late morning (does not wake up with them), lasts for a few hours, states then it resolves, then it comes back in the evening  severity 4-5/10, describes it as dull and achy and when more severe- which is 2x/month typically- states is throbbing/pulsating in naturephotophobia, phonophobia, nausea or emesis  does tell me she is more fatigued and occasionally light headed with activity  states she gets she gets at least 8-9 hours of sleep at night, states does not feel rested for a bit when she wakes up in the morning, however that is not new for her  States does not wake up with a headache or dry mouth  States can wake up upto 3 times a night and can fall asleep within a few minutes  Denies trouble falling asleep states she was on Paxil 20 mg for depression and anxiety- is getting worse  Denies suicidal or homicidal ideation  States she took the Paxil again after a few months when re-prescribed by her PCP  she suddenly stopped the Paxil a few months ago herself as she was switching doctors  States noticed more short term memory issues since stopping the Paxil  She states a few months afterward her new PCP restarted her on Paxil and had adverse effects, thus not interested in going back on this medication or a similar medication at this time  States she saw a counselor/therapist once and may continue  does not drive, as she has not gotten around to getting the license yet  tried: Fioricet which was slightly helpful- states took it every four hours as prescribed-took for two weeks  Preventative medications tried: none  Has not tried acupuncture or trigger points or Botox  history migraines in mom  head injuries/concussions  Neurology HPI Juan Johns:   Headache: On a scale of 0-10, the pain severity is a 0  the headaches started at age 24    no accidents or injury prior to the onset of headaches  Headaches are occurring daily-- and-- in the morning--   the pain is not present upon awakening  headache lasts for 2-3 to 24 hour(s)  -- the patient has periods of being headache free  Currently the pain is bilateral,-- in the frontal region,-- in the temporal region-- and-- at the vertex  Warning(s) prior to headache include photopsia-- and-- occasionally aura, occasional peripheral vision loss  Usual headache is described as pounding and dull  Associated symptoms include no photophobia,-- no phonophobia,-- no tinnitus,-- no lacrimation,-- no sensitivity to smell,-- no flushing,-- no blurred vision,-- no loss of appetite,-- no nausea,-- no vomiting,-- no runny or stuffed up nose,-- not with darkness,-- no lightheadedness or dizziness,-- no tingling or numbness of the hands,-- no tingling or numbness of the feet,-- no stiff or sore neck-- and-- patient is able to work  Headaches are made worse by not while coughing,-- not while sneezing-- and-- not while moving bowel  Headaches are triggered by fatigue,-- changes in the weather-- and-- eating certain foods (salty foods), but-- not stress/tension,-- not with menstruation,-- not while drinking alcohol,-- not with certain medication,-- not by coughing,-- not while oversleeping-- and-- not when lying down  Review of Systems  Neurological ROS:   Constitutional: fatigue  HEENT: tinnitus  Cardiovascular:  no chest pain or pressure, no palpitations present, the heart rate was not rapid or irregular, no swelling in the arms or legs, no poor circulation     Respiratory:  no unusual or persistant cough, no shortness of breath with or without exertion  Gastrointestinal:  no nausea, no vomiting, no diarrhea, no abdominal pain, no changes in bowel habits, no melena, no loss of bowel control  Genitourinary:  no incontinence, no feelings of urinary urgency, no increase in frequency, no urinary hesitancy, no dysuria, no hematuria  Musculoskeletal:  no arthralgias, no myalgias, no immobility or loss of function, no head/neck/back pain, no pain while walking  Integumentary  no masses, no rash, no skin lesions, no livedo reticularis  Psychiatric: anxiety-- and-- depression  Endocrine  no unusual weight loss or gain, no excessive urination, no excessive thirst, no hair loss or gain, no hot or cold intolerance, no menstrual period change or irregularity, no loss of sexual ability or drive, no erection difficulty, no nipple discharge  Hematologic/Lymphatic:  no unusual bleeding, no tendency for easy bruising, no clotting skin or lumps  Neurological General: headache,-- lightheadedness-- and-- waking up at night  Neurological Mental Status: memory problems  Neurological Cranial Nerves: loss of vision-- and-- vertigo or dizziness  Neurological Motor findings include:  no tremor, no twitching, no cramping(pre/post exercise), no atrophy  Neurological Coordination: clumsiness  Neurological Sensory:  no numbness, no pain, no tingling, does not fall when eyes closed or taking a shower  Neurological Gait:  no difficulty walking, not falling to one side, no sensation of being pushed, has not had falls  ROS Reviewed:   ROS reviewed  Active Problems  1  Anemia (285 9) (D64 9)   2  Anxiety associated with depression (300 4) (F41 8)   3  Hypothyroid (244 9) (E03 9)   4  Microscopic hematuria (599 72) (R31 29)   5  New daily persistent headache (339 42) (G44 52)   6  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)   7  Recurrent UTI (599 0) (N39 0)   8   Transient visual loss of left eye (368 12) (H53 122)    Past Medical History  1  History of Abnormal thyroid stimulating hormone (TSH) level (790 6) (R94 6)   2  History of Acute tonsillitis, unspecified etiology (463) (J03 90)   3  History of Anxiety (300 00) (F41 9)   4  History of Dysphagia, oropharyngeal phase (787 22) (R13 12)   5  History of depression (V11 8) (Z86 59)   6  History of dysuria (V13 00) (Z87 898)   7  History of fatigue (V13 89) (Z87 898)   8  History of hematuria (V13 09) (Z87 448)   9  History of urinary frequency (V13 09) (Z87 898)   10  History of Insect bite, multiple (919 4,E906 4) (W57 XXXA)   11  History of Mental health problem (V40 9) (F48 9)   12  History of Thyroid trouble (246 9) (E07 9)   13  History of Yeast vaginitis (112 1) (B37 3)  Active Problems And Past Medical History Reviewed: The active problems and past medical history were reviewed and updated today  Family History  Mother    1  Family history of alcoholism (V17 0) (Z81 1)   2  Family history of hepatic cirrhosis (V18 59) (Z83 79)  Father    3  Family history of hypertension (V17 49) (Z82 49)  Maternal Grandfather    4  Family history of alcoholism (V17 0) (Z81 1)   5  Family history of hepatic cirrhosis (V18 59) (Z83 79)  Family History Reviewed: The family history was reviewed and updated today  Social History   · Always uses seat belt   · Current every day smoker (305 1) (F17 200)   · Denied: History of Drug Use   · Moderate alcohol use   · Never a smoker   · No alcohol use   · Non-smoker (V49 89) (Z78 9)  Social History Reviewed: The social history was reviewed and updated today  Current Meds   1  Butalbital-APAP-Caffeine -40 MG Oral Capsule; 1 tablet every 4-6 hrs as needed   for migraine; Therapy: 33Shj7667 to (Last Rx:83Ybw8337)  Requested for: 79Sse2738 Ordered   2  Ferrous Sulfate 325 (65 Fe) MG Oral Tablet; Take 1 tablet twice daily; Therapy: 83HUX5607 to (Last Rx:09Oct2017)  Requested for: 36JUA0962 Ordered   3   Levothyroxine Sodium 50 MCG Oral Tablet; TAKE 1 TABLET DAILY; Therapy: 74NGB0937 to (Evaluate:07Apr2018)  Requested for: 97LFM8088; Last   Rx:47Jve5010 Ordered   4  Multiple Vitamins Oral Tablet; Take 1 daily; Therapy: 23Rmx8306 to (Last Rx:05Tki0385) Ordered   5  Rizatriptan Benzoate 10 MG Oral Tablet Disintegrating; TAKE 1 TABLET AT ONSET OF   HEADACHE  MAY REPEAT EVERY 2 HOURS AS NEEDED  MAXIMUM 3 TABLETS IN 24   HOURS; Therapy: 85MLB1540 to (Last Rx:46Zbp7126)  Requested for: 09Oct2017 Ordered    Allergies  1  No Known Drug Allergies    Vitals  Signs   Recorded: 37BPW7902 10:52AM   Heart Rate: 70  Respiration: 16  Systolic: 804  Diastolic: 70  Height: 5 ft   Weight: 113 lb   BMI Calculated: 22 07  BSA Calculated: 1 46  O2 Saturation: 98    Physical Exam    Constitutional   General Appearance: Appears appropriate for age, healthy, well developed, appropriately groomed and appropriately dressed    Eyes   Ophthalmoscopic examination: Vision is grossly normal  Gross visual field testing by confrontation shows no abnormalities  EOMI in both eyes  Conjunctivae clear  Eyelids normal palpebral fissures equal  Orbits exhibit normal position  No discharge from the eyes  PERRL  External inspection of ears and nose: Normal       Pulmonary   Respiratory effort: Lungs are clear bilaterally  Cardiovascular   Auscultation of heart: Rate is regular  Rhythm is regular  Peripheral vascular exam: Normal pulses throughout, no tenderness, erythema or swelling  Abdomen: Positive bowel sounds  Musculoskeletal   Gait and Station: Walks with normal gait  Tandem walk test is normal  Romberg's test is negative  Muscle strength: Normal strength throughout  Muscle tone: No atrophy, abnormal movements, flaccidity, cogwheeling or spasticity        Range of motion: Normal     Stability: Normal     Inspection of temporomandibular joint appears normal     Neurologic   Orientation to person, place, and time: Normal     Attention span and concentration: Normal thought process and attention span  Language: Names objects, able to repeat phrases and speaks spontaneously  Fund of knowledge: Normal vocabulary with appropriate knowledge of current events and past history  Sensation: Intact sensation to pinprick, temperature, vibration, and proprioception in all four extremities  Reflexes: DTR's are normal and symmetric bilaterally  Babinski's reflex is negative bilaterally  No pathologic ankle clonus  Coordination: Cerebellum function intact  No involuntary movement or psychomotor activity  Motor System: No pronator drift  Upper Extremities: Normal to inspection  No tenderness over the upper extremities bilaterally  No instability bilaterally  Strength: Motor strength is 5/5 bilaterally  Normal muscle tone bilaterally  Muscle bulk: Muscle bulk is normal bilaterally  Full ROM bilaterally  Lower Extremities: Normal to inspection and palpation  No tenderness of the lower extremities bilaterally  Exhibits no instability bilaterally  Strength: Motor strength is 5/5 bilaterally  Normal muscle tone bilaterally  Muscle Bulk: Muscle bulk is normal bilaterally  Full ROM bilaterally  Cranial Nerve Exam   II: Normal with no deficit  III,IV, VI: Normal with no deficit  V: Normal with no deficit  VII: Normal with no deficit  VIII: Normal with no deficit  IX: Normal with no deficit  X: Normal with no deficit  XI: Normal with no deficit  XII: Normal with no deficit  Recent and remote memory: Intact      Mood and affect: Normal        Future Appointments    Date/Time Provider Specialty Site   12/21/2017 03:30 PM Pedro Lema DO Family Medicine [id number]   01/15/2018 03:30 PM Matt Kamara MD Neurology [place] [id number]   12/07/2017 01:15 PM Michell Graves Urology [facility]     Signatures   Electronically signed by : Neto Fraire MD; Oct 18 2017  4:09PM EST (Author)

## 2017-12-09 DIAGNOSIS — D64.9 ANEMIA: ICD-10-CM

## 2017-12-09 DIAGNOSIS — E03.9 HYPOTHYROIDISM: ICD-10-CM

## 2017-12-21 ENCOUNTER — ALLSCRIPTS OFFICE VISIT (OUTPATIENT)
Dept: OTHER | Facility: OTHER | Age: 22
End: 2017-12-21

## 2017-12-22 ENCOUNTER — LAB CONVERSION - ENCOUNTER (OUTPATIENT)
Dept: OTHER | Facility: OTHER | Age: 22
End: 2017-12-22

## 2017-12-22 ENCOUNTER — GENERIC CONVERSION - ENCOUNTER (OUTPATIENT)
Dept: OTHER | Facility: OTHER | Age: 22
End: 2017-12-22

## 2017-12-22 LAB
BASOPHILS # BLD AUTO: 0.9 %
BASOPHILS # BLD AUTO: 68 CELLS/UL (ref 0–200)
DEPRECATED RDW RBC AUTO: 14.7 % (ref 11–15)
EOSINOPHIL # BLD AUTO: 1.4 %
EOSINOPHIL # BLD AUTO: 106 CELLS/UL (ref 15–500)
HCT VFR BLD AUTO: 37 % (ref 35–45)
HGB BLD-MCNC: 12.1 G/DL (ref 11.7–15.5)
IRON SERPL-MCNC: 80 MCG/DL (ref 40–190)
LYMPHOCYTES # BLD AUTO: 3618 CELLS/UL (ref 850–3900)
LYMPHOCYTES # BLD AUTO: 47.6 %
MCH RBC QN AUTO: 25.6 PG (ref 27–33)
MCHC RBC AUTO-ENTMCNC: 32.7 G/DL (ref 32–36)
MCV RBC AUTO: 78.4 FL (ref 80–100)
MONOCYTES # BLD AUTO: 608 CELLS/UL (ref 200–950)
MONOCYTES (HISTORICAL): 8 %
NEUTROPHILS # BLD AUTO: 3200 CELLS/UL (ref 1500–7800)
NEUTROPHILS # BLD AUTO: 42.1 %
PLATELET # BLD AUTO: 303 THOUSAND/UL (ref 140–400)
PMV BLD AUTO: 11.9 FL (ref 7.5–12.5)
RBC # BLD AUTO: 4.72 MILLION/UL (ref 3.8–5.1)
T4 FREE SERPL-MCNC: 1.5 NG/DL (ref 0.8–1.8)
TSH SERPL DL<=0.05 MIU/L-ACNC: 0.85 MIU/L
WBC # BLD AUTO: 7.6 THOUSAND/UL (ref 3.8–10.8)

## 2017-12-22 NOTE — PROGRESS NOTES
Assessment   1  Hypothyroid (244 9) (E03 9)   2  Anemia (285 9) (D64 9)   3  Anxiety associated with depression (300 4) (F41 8)    Plan   Anemia    · Ferrous Sulfate 325 (65 Fe) MG Oral Tablet; ONE TABLET TWO TIMES DAILY (AM    AND PM)   · (1) CBC/PLT/DIFF; Status:Active; Requested for:47Dxr2567;    · (1) IRON; Status:Active; Requested for:76Jps2912;   Hypothyroid    · (1) T4, FREE; Status:Active; Requested for:37Ajw6288;    · (1) TSH; Status:Active; Requested for:82Ebf9020; Follow up with a counselor  Follow up in this office in 3-6 months depending on her lab work  Staff will contact her once we have the results  Continue current medications  I hand printed a prescription for her so she could take the iron prescription to the pharmacist and if they would not supply it, they would at least shoulder where she could find  Chief Complaint   follow up thyroid  no refills needed      History of Present Illness   Patient is a 50-year-old female presenting to the office for follow-up on her hypothyroidism, anemia, headaches, and general health  She has been going to a counselor for her depression and anxiety and that has been working well for her  She feels much better  She does not feel she needs medication at this time  States she never started taking the iron as the pharmacist told her it was available over-the-counter  So she states that she could not find it in the pharmacy  Active Problems   1  Anemia (285 9) (D64 9)   2  Anxiety associated with depression (300 4) (F41 8)   3  Chronic tension-type headache, intractable (339 12) (G44 221)   4  Hypothyroid (244 9) (E03 9)   5  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)    Past Medical History   1  History of Abnormal thyroid stimulating hormone (TSH) level (790 6) (R94 6)   2  History of Acute tonsillitis, unspecified etiology (463) (J03 90)   3  History of Anxiety (300 00) (F41 9)   4  History of Dysphagia, oropharyngeal phase (787 22) (R13 12)   5  History of depression (V11 8) (Z86 59)   6  History of dysuria (V13 00) (Z87 898)   7  History of fatigue (V13 89) (Z87 898)   8  History of hematuria (V13 09) (Z87 448)   9  History of recurrent urinary tract infection (V13 02) (Z87 440)   10  History of urinary frequency (V13 09) (Z87 898)   11  History of Insect bite, multiple (919 4,E906 4) (W57 XXXA)   12  History of Mental health problem (V40 9) (F48 9)   13  History of Microscopic hematuria (599 72) (R31 29)   14  History of New daily persistent headache (339 42) (G44 52)   15  History of Thyroid trouble (246 9) (E07 9)   16  History of Transient visual loss of left eye (368 12) (H53 122)   17  History of Yeast vaginitis (112 1) (B37 3)    Surgical History      The surgical history was reviewed and updated today  Family History   Mother    1  Family history of alcoholism (V17 0) (Z81 1)   2  Family history of hepatic cirrhosis (V18 59) (Z83 79)  Father    3  Family history of hypertension (V17 49) (Z82 49)  Maternal Grandfather    4  Family history of alcoholism (V17 0) (Z81 1)   5  Family history of hepatic cirrhosis (V18 59) (Z83 79)     The family history was reviewed and updated today  Social History    · Always uses seat belt   · Current every day smoker (305 1) (F17 200)   · Denied: History of Drug Use   · Moderate alcohol use   · Never a smoker   · No alcohol use   · Non-smoker (V49 89) (Z78 9)  The social history was reviewed and updated today  The social history was reviewed and is unchanged  Current Meds    1  Ferrous Sulfate 325 (65 Fe) MG Oral Tablet; Take 1 tablet twice daily; Therapy: 39HWB7489 to (Last Rx:09Oct2017)  Requested for: 79SFB3600 Ordered   2  Levothyroxine Sodium 50 MCG Oral Tablet; TAKE 1 TABLET DAILY; Therapy: 67WYB4127 to (Evaluate:07Apr2018)  Requested for: 71VCW7308; Last     Rx:09Oct2017 Ordered   3  Multiple Vitamins Oral Tablet; Take 1 daily;      Therapy: 41Vax6175 to (Last Rx:45Mve3731) Ordered The medication list was reviewed and updated today  Allergies   1  No Known Drug Allergies    Vitals   Vital Signs    Recorded: 14VUM3358 05:23AP   Systolic 249   Diastolic 72   Height 5 ft    Weight 113 lb    BMI Calculated 22 07   BSA Calculated 1 46     Physical Exam        Constitutional      General appearance: No acute distress, well appearing and well nourished  Eyes      Conjunctiva and lids: No swelling, erythema or discharge  Pupils and irises: Equal, round and reactive to light  Ears, Nose, Mouth, and Throat      External inspection of ears and nose: Normal        Otoscopic examination: Tympanic membranes translucent with normal light reflex  Canals patent without erythema  Nasal mucosa, septum, and turbinates: Normal without edema or erythema  Oropharynx: Normal with no erythema, edema, exudate or lesions  Pulmonary      Respiratory effort: No increased work of breathing or signs of respiratory distress  Auscultation of lungs: Clear to auscultation  Cardiovascular      Palpation of heart: Normal PMI, no thrills  Auscultation of heart: Normal rate and rhythm, normal S1 and S2, without murmurs  Examination of extremities for edema and/or varicosities: Normal        Carotid pulses: Normal        Abdomen      Abdomen: Non-tender, no masses  Liver and spleen: No hepatomegaly or splenomegaly  Lymphatic      Palpation of lymph nodes in neck: No lymphadenopathy  Musculoskeletal      Gait and station: Normal        Digits and nails: Normal without clubbing or cyanosis  Inspection/palpation of joints, bones, and muscles: Normal        Skin      Skin and subcutaneous tissue: Normal without rashes or lesions  Neurologic      Cranial nerves: Cranial nerves 2-12 intact  Reflexes: 2+ and symmetric         Psychiatric      Orientation to person, place, and time: Normal        Mood and affect: Normal  Results/Data   (1) URINALYSIS (will reflex a microscopy if leukocytes, occult blood, protein or nitrites are not within normal limits) 16Oct2017 09:46PM Adarsh Rabago Order Number: [id number]      Test Name Result Flag Reference   COLOR Yellow     CLARITY Clear     SPECIFIC GRAVITY UA 1 022  1 003-1 030   PH UA 6 0  4 5-8 0   LEUKOCYTE ESTERASE UA Negative  Negative   NITRITE UA Negative  Negative   PROTEIN UA Negative mg/dl  Negative   GLUCOSE UA Negative mg/dl  Negative   KETONES UA Negative mg/dl  Negative   UROBILINOGEN UA 0 2 E U /dl  0 2, 1 0 E U /dl   BILIRUBIN UA Negative  Negative   BLOOD UA Negative  Negative      (1) TSH 26Sep2017 08:36AM Yusuf Erickson    Order Number: [id number]      Test Name Result Flag Reference   TSH 6 436 uIU/mL H 0 358-3 740   Patients undergoing fluorescein dye angiography may retain small amounts of fluorescein in the body for 48-72 hours post procedure  Samples containing fluorescein can produce falsely depressed TSH values  If the patient had this procedure,a specimen should be resubmitted post fluorescein clearance  The recommended reference ranges for TSH during pregnancy are as follows:     First trimester 0 1 to 2 5 uIU/mL     Second trimester  0 2 to 3 0 uIU/mL     Third trimester 0 3 to 3 0 uIU/m      (1) THYROXINE 26Sep2017 08:36AM [employer] Order Number: [id number]      Test Name Result Flag Reference   T4 TOTAL 10 0 ug/dL  4 7-13 3      (1) IRON 44LUD3562 08:36AM [employer] Order Number: [id number]      Test Name Result Flag Reference   IRON 24 ug/dL L    Patients treated with metal-binding drugs (ie  Deferoxamine) may have depressed iron values        (1) FOLATE 82TMK0170 08:36AM [employer] Order Number: [id number]      Test Name Result Flag Reference   FOLATE >20 0 ng/mL H 3 1-17 5      * MRI BRAIN WO CONTRAST 26Sep2017 07:49AM Yusuf Erickson      Test Name Result Flag Reference   MRI BRAIN WO CONTRAST (Report)     MRI BRAIN WITHOUT CONTRAST           INDICATION: Migraines, 1-2 months           COMPARISON:  None  TECHNIQUE: Sagittal T1, axial T2, axial FLAIR, axial T1, axial Gradient and axial diffusion imaging  IMAGE QUALITY: Diagnostic  FINDINGS:           BRAIN PARENCHYMA: No acute disease  There is no acute ischemia, intracranial mass, mass effect or edema  No indication of pathologic hemosiderin deposition  Tiny nonspecific focus of high signal in the subinsular parenchyma on the left  These have been described in patients with complicated migraine  VENTRICLES: The ventricles are normal in size and contour  SELLA AND PITUITARY GLAND: Normal            ORBITS: Normal            PARANASAL SINUSES: Normal            VASCULATURE: Evaluation of the major intracranial vasculature demonstrates appropriate flow voids  CALVARIUM AND SKULL BASE: Normal            EXTRACRANIAL SOFT TISSUES: Normal                 IMPRESSION:           No acute disease  No mass or edema  Tiny, nonspecific solitary focus of high signal subinsular white matter  Workstation performed: [id number]           Signed by: Micky Arreguin MD      9/26/17      Health Management   Current every day smoker   *[facility] - Smoking and Tobacco Cessation Counseling 3-10 minutes; every 1 year; Next Due:    97XDP0962;  Overdue    Future Appointments      Date/Time Provider Specialty Site   01/15/2018 03:30 PM Azalia Litten, MD Neurology [address]     Signatures    Electronically signed by : Sparkle Montez DO; Dec 21 2017  3:25PM EST                       (Author)

## 2018-01-12 VITALS
TEMPERATURE: 98.3 F | SYSTOLIC BLOOD PRESSURE: 110 MMHG | HEIGHT: 60 IN | DIASTOLIC BLOOD PRESSURE: 72 MMHG | WEIGHT: 110 LBS | BODY MASS INDEX: 21.6 KG/M2

## 2018-01-13 VITALS
SYSTOLIC BLOOD PRESSURE: 100 MMHG | DIASTOLIC BLOOD PRESSURE: 70 MMHG | TEMPERATURE: 98.7 F | HEIGHT: 60 IN | WEIGHT: 118 LBS | BODY MASS INDEX: 23.16 KG/M2

## 2018-01-13 VITALS
DIASTOLIC BLOOD PRESSURE: 70 MMHG | WEIGHT: 115 LBS | BODY MASS INDEX: 22.58 KG/M2 | SYSTOLIC BLOOD PRESSURE: 100 MMHG | HEIGHT: 60 IN | TEMPERATURE: 98.4 F

## 2018-01-13 VITALS
DIASTOLIC BLOOD PRESSURE: 68 MMHG | BODY MASS INDEX: 22.26 KG/M2 | HEART RATE: 70 BPM | SYSTOLIC BLOOD PRESSURE: 112 MMHG | HEIGHT: 60 IN | WEIGHT: 113.38 LBS

## 2018-01-13 VITALS
SYSTOLIC BLOOD PRESSURE: 110 MMHG | HEART RATE: 70 BPM | BODY MASS INDEX: 22.19 KG/M2 | HEIGHT: 60 IN | WEIGHT: 113 LBS | RESPIRATION RATE: 16 BRPM | DIASTOLIC BLOOD PRESSURE: 70 MMHG | OXYGEN SATURATION: 98 %

## 2018-01-13 VITALS
HEIGHT: 60 IN | DIASTOLIC BLOOD PRESSURE: 70 MMHG | WEIGHT: 115 LBS | BODY MASS INDEX: 22.58 KG/M2 | SYSTOLIC BLOOD PRESSURE: 110 MMHG

## 2018-01-13 NOTE — RESULT NOTES
Discussion/Summary   elenaes not a source for the  blood in her urine, wouls refer to urology  let me now is she is willing to to so  Verified Results  [address] [id number] 10:14AM David Early Order Number: [id number]    - Patient Instructions: To schedule this appointment, please contact Central Scheduling at [phone]  Test Name Result Flag Reference   US KIDNEY AND BLADDER (Report)     RENAL ULTRASOUND     INDICATION: Recurrent urinary tract infection approximately 3-4 per year  Microscopic hematuria  COMPARISON: None  TECHNIQUE:  Ultrasound of the retroperitoneum was performed with a curvilinear transducer utilizing volumetric sweeps and still imaging techniques  FINDINGS:     KIDNEYS:   Symmetric and normal size  Right kidney: 9 4 cm  Normal echogenicity and contour  No suspicious masses detected  No hydronephrosis  No shadowing calculi  No perinephric fluid collections  Left kidney: 10 cm  Normal echogenicity and contour  No suspicious masses detected  No hydronephrosis  No shadowing calculi  No perinephric fluid collections  URETERS:   Nonvisualized  BLADDER:    Normally distended  No focal thickening or mass lesions  Bilateral ureteral jets detected  IMPRESSION:     Normal         Workstation performed: [id number]     Signed by:    Jeanne Craven MD   5/9/17

## 2018-01-14 VITALS
HEIGHT: 60 IN | WEIGHT: 113 LBS | DIASTOLIC BLOOD PRESSURE: 62 MMHG | SYSTOLIC BLOOD PRESSURE: 110 MMHG | BODY MASS INDEX: 22.19 KG/M2

## 2018-01-14 VITALS
SYSTOLIC BLOOD PRESSURE: 118 MMHG | TEMPERATURE: 98.2 F | WEIGHT: 115.5 LBS | DIASTOLIC BLOOD PRESSURE: 78 MMHG | BODY MASS INDEX: 22.68 KG/M2 | HEIGHT: 60 IN

## 2018-01-14 NOTE — PROGRESS NOTES
Assessment    1  Acute tonsillitis, unspecified etiology (463) (J03 90)   2  Dysphagia, oropharyngeal phase (787 22) (R13 12)    Plan  Acute tonsillitis, unspecified etiology    · Rapid StrepA- POC; Source:Throat; Status:Active; Requested for:18Jan2016;   Acute tonsillitis, unspecified etiology, Dysphagia, oropharyngeal phase    · Amoxicillin 875 MG Oral Tablet; TAKE 1 TABLET EVERY 12 HOURS DAILY   · MethylPREDNISolone 4 MG Oral Tablet; USE AS DIRECTED   · Follow Up if Not Better Evaluation and Treatment  Follow-up  Status: Complete  Done:  [id number]    Discussion/Summary    Reviewed hydration  Possible side effects of new medications were reviewed with the patient/guardian today  The treatment plan was reviewed with the patient/guardian  The patient/guardian understands and agrees with the treatment plan      Chief Complaint  c/o possibly having strep throat, states she feels her throat swollen, started 3 days ago  History of Present Illness  HPI: Sort throat since past Thursday  History of Strep  Hurts to swallow  Fever on and off since Thursday  PE: Exudative tonsillitis  Tonsils symmetrically swollen  Rapid Strep was negative  Review of Systems    Constitutional: No fever, no chills, feels well, no tiredness, no recent weight gain or loss  ENT: as noted in HPI  Cardiovascular: no complaints of slow or fast heart rate, no chest pain, no palpitations, no leg claudication or lower extremity edema  Respiratory: no complaints of shortness of breath, no wheezing, no dyspnea on exertion, no orthopnea or PND  Gastrointestinal: no complaints of abdominal pain, no constipation, no nausea or diarrhea, no vomiting, no bloody stools  Genitourinary: no complaints of dysuria, no incontinence, no pelvic pain, no dysmenorrhea, no vaginal discharge or abnormal vaginal bleeding  Musculoskeletal: no complaints of arthralgia, no myalgia, no joint swelling or stiffness, no limb pain or swelling  Integumentary: no complaints of skin rash or lesion, no itching or dry skin, no skin wounds  Neurological: no complaints of headache, no confusion, no numbness or tingling, no dizziness or fainting  Active Problems    1  Anxiety (300 00) (F41 9)   2  Depression (311) (F32 9)   3  Fatigue (780 79) (R53 83)    Family History    1  Family history of alcoholism (V17 0) (Z81 1)   2  Family history of hepatic cirrhosis (V18 59) (Z83 79)    3  Family history of alcoholism (V17 0) (Z81 1)   4  Family history of hepatic cirrhosis (V18 59) (Z83 79)    Social History    · Current every day smoker (305 1) (F17 200)   · Denied: History of Drug Use   · Moderate alcohol use    Current Meds   1  PARoxetine HCl - 30 MG Oral Tablet; TAKE 1 TABLET DAILY; Therapy: [id number] to (Evaluate:95Ilh5685)  Requested for: 22TMM1516; Last   Rx:16Nov2015 Ordered    Allergies    1  No Known Drug Allergies    Vitals   Recorded: 76APV2515 01:56PM   Temperature 100 3 F   Heart Rate 117   Respiration 16   Systolic 066   Diastolic 74   Height 5 ft    Weight 122 lb    BMI Calculated 23 83   BSA Calculated 1 51   O2 Saturation 98     Physical Exam    Constitutional   General appearance: No acute distress, well appearing and well nourished  Eyes   Conjunctiva and lids: No swelling, erythema or discharge  Pupils and irises: Equal, round and reactive to light  Ears, Nose, Mouth, and Throat   External inspection of ears and nose: Normal     Otoscopic examination: Tympanic membranes translucent with normal light reflex  Canals patent without erythema  Nasal mucosa, septum, and turbinates: Normal without edema or erythema  Oropharynx: Abnormal   See HPI  Pulmonary   Respiratory effort: No increased work of breathing or signs of respiratory distress  Auscultation of lungs: Clear to auscultation  Cardiovascular   Auscultation of heart: Normal rate and rhythm, normal S1 and S2, without murmurs      Examination of extremities for edema and/or varicosities: Normal     Abdomen   Abdomen: Non-tender, no masses  Liver and spleen: No hepatomegaly or splenomegaly  Lymphatic   Palpation of lymph nodes in neck: No lymphadenopathy  Musculoskeletal   Gait and station: Normal     Digits and nails: Normal without clubbing or cyanosis  Skin   Skin and subcutaneous tissue: Normal without rashes or lesions  Neurologic   Cranial nerves: Cranial nerves 2-12 intact  Reflexes: 2+ and symmetric  Sensation: No sensory loss      Psychiatric   Orientation to person, place, and time: Normal     Mood and affect: Normal          Future Appointments    Date/Time Provider Specialty Site   02/16/2016 03:15 PM Migdalia Oconnor, [address] [place], [place] [address]      Signatures   Electronically signed by : Nicolas Glass DO; Jan 18 2016  2:29PM EST                       (Author)

## 2018-01-16 NOTE — RESULT NOTES
Discussion/Summary   Blood work showed that she is a little anemic and her potassium is a little low  Find out where she was with her menstrual cycle when she had the blood work done  We may need to repeat some blood work for her  Otherwise, keep her scheduled appointment  Verified Results  (1) TSH [id number] 10:26AM [facility] Order Number: [id number]     Test Name Result Flag Reference   TSH 4 881 uIU/mL H 0 358-3 740   Patients undergoing fluorescein dye angiography may retain small amounts of fluorescein in the body for 48-72 hours post procedure  Samples containing fluorescein can produce falsely depressed TSH values  If the patient had this procedure,a specimen should be resubmitted post fluorescein clearance  The recommended reference ranges for TSH during pregnancy are as follows:  First trimester 0 1 to 2 5 uIU/mL  Second trimester  0 2 to 3 0 uIU/mL  Third trimester 0 3 to 3 0 uIU/m     (1) COMPREHENSIVE METABOLIC PANEL [id number] 16:15PP [facility] Order Number: [id number]     Test Name Result Flag Reference   GLUCOSE,RANDM 94 mg/dL     If the patient is fasting, the ADA then defines impaired fasting glucose as > 100 mg/dL and diabetes as > or equal to 123 mg/dL     SODIUM 137 mmol/L  136-145   POTASSIUM 3 2 mmol/L L 3 5-5 3   CHLORIDE 100 mmol/L  100-108   CARBON DIOXIDE 25 mmol/L  21-32   ANION GAP (CALC) 12 mmol/L  4-13   BLOOD UREA NITROGEN 5 mg/dL  5-25   CREATININE 0 80 mg/dL  0 60-1 30   Standardized to IDMS reference method   CALCIUM 9 3 mg/dL  8 3-10 1   BILI, TOTAL 0 54 mg/dL  0 20-1 00   ALK PHOSPHATAS 72 U/L     ALT (SGPT) 14 U/L  12-78   AST(SGOT) 12 U/L  5-45   ALBUMIN 4 5 g/dL  3 5-5 0   TOTAL PROTEIN 7 8 g/dL  6 4-8 2   eGFR Non-African American      >60 0 ml/min/1 73sq m   [facility] Disease Education Program recommendations are as follows:  GFR calculation is accurate only with a steady state creatinine  Chronic Kidney disease less than 60 ml/min/1 73 sq  meters  Kidney failure less than 15 ml/min/1 73 sq  meters  (1) CBC/PLT/DIFF [id number] 10:26AM Alea Ismael Order Number: [id number]     Test Name Result Flag Reference   WBC COUNT 10 10 Thousand/uL  4 31-10 16   RBC COUNT 4 83 Million/uL  3 81-5 12   HEMOGLOBIN 10 7 g/dL L 11 5-15 4   HEMATOCRIT 34 7 % L 34 8-46  1   MCV 72 fL L 82-98   MCH 22 2 pg L 26 8-34 3   MCHC 30 8 g/dL L 31 4-37 4   RDW 17 6 % H 11 6-15 1   MPV 11 3 fL  8 9-12 7   PLATELET COUNT 195 Thousands/uL  149-390   nRBC AUTOMATED 0 /100 WBCs     NEUTROPHILS RELATIVE PERCENT 63 %  43-75   LYMPHOCYTES RELATIVE PERCENT 28 %  14-44   MONOCYTES RELATIVE PERCENT 8 %  4-12   EOSINOPHILS RELATIVE PERCENT 1 %  0-6   BASOPHILS RELATIVE PERCENT 0 %  0-1   NEUTROPHILS ABSOLUTE COUNT 6 27 Thousands/? ??L  1 85-7 62   LYMPHOCYTES ABSOLUTE COUNT 2 85 Thousands/? ??L  0 60-4 47   MONOCYTES ABSOLUTE COUNT 0 85 Thousand/? ??L  0 17-1 22   EOSINOPHILS ABSOLUTE COUNT 0 09 Thousand/? ??L  0 00-0 61   BASOPHILS ABSOLUTE COUNT 0 04 Thousands/? ??L  0 00-0 10

## 2018-01-16 NOTE — RESULT NOTES
Verified Results  (1) COMPREHENSIVE METABOLIC PANEL [id number] 71:23IX Flori Farnsworth    Order Number: [id number]     Test Name Result Flag Reference   SODIUM 139 mmol/L  136-145   POTASSIUM 4 0 mmol/L  3 5-5 3   CHLORIDE 103 mmol/L  100-108   CARBON DIOXIDE 31 mmol/L  21-32   ANION GAP (CALC) 5 mmol/L  4-13   BLOOD UREA NITROGEN 11 mg/dL  5-25   CREATININE 0 75 mg/dL  0 60-1 30   Standardized to IDMS reference method   CALCIUM 9 2 mg/dL  8 3-10 1   BILI, TOTAL 0 13 mg/dL L 0 20-1 00   ALK PHOSPHATAS 84 U/L     ALT (SGPT) 41 U/L  12-78   Specimen collection should occur prior to Sulfasalazine administration due to the potential for falsely depressed results  AST(SGOT) 18 U/L  5-45   Specimen collection should occur prior to Sulfasalazine administration due to the potential for falsely depressed results  ALBUMIN 4 1 g/dL  3 5-5 0   TOTAL PROTEIN 7 6 g/dL  6 4-8 2   eGFR 114 ml/min/1 73sq [facility] Disease Education Program recommendations are as follows:  GFR calculation is accurate only with a steady state creatinine  Chronic Kidney disease less than 60 ml/min/1 73 sq  meters  Kidney failure less than 15 ml/min/1 73 sq  meters  GLUCOSE FASTING 98 mg/dL  65-99   Specimen collection should occur prior to Sulfasalazine administration due to the potential for falsely depressed results  Specimen collection should occur prior to Sulfapyridine administration due to the potential for falsely elevated results       (1) C-REACTIVE PROTEIN [id number] 08:36AM Magalene Jock Order Number: [id number]     Test Name Result Flag Reference   C-REACT PROTEIN <3 0 mg/L  <3 0     (1) SED RATE [id number] 08:36AM Magalene Jock Order Number: [id number]     Test Name Result Flag Reference   SED RATE 4 mm/hour  0-20     (1) TSH [id number] 08:36AM Magalene Jock Order Number: [id number]     Test Name Result Flag Reference   TSH 6 436 uIU/mL H 0 358-3 740   Patients undergoing fluorescein dye angiography may retain small amounts of fluorescein in the body for 48-72 hours post procedure  Samples containing fluorescein can produce falsely depressed TSH values  If the patient had this procedure,a specimen should be resubmitted post fluorescein clearance  The recommended reference ranges for TSH during pregnancy are as follows:  First trimester 0 1 to 2 5 uIU/mL  Second trimester  0 2 to 3 0 uIU/mL  Third trimester 0 3 to 3 0 uIU/m     (1) THYROXINE [id number] 08:36AM Delle Nones Order Number: [id number]     Test Name Result Flag Reference   T4 TOTAL 10 0 ug/dL  4 7-13 3     (1) FERRITIN [id number] 08:36AM Delle Nones Order Number: [id number]     Test Name Result Flag Reference   FERRITIN 4 ng/mL L 8-388     (1) TRANSFERRIN [id number] 08:36AM Delle Nones Order Number: [id number]     Test Name Result Flag Reference   TRANSFERRIN 325 mg/dL  200-400     (1) IRON [id number] 08:36AM Delle Nones Order Number: [id number]     Test Name Result Flag Reference   IRON 24 ug/dL L    Patients treated with metal-binding drugs (ie  Deferoxamine) may have depressed iron values  (1) VITAMIN B12 [id number] 08:36AM Delle Nones Order Number: [id number]     Test Name Result Flag Reference   VITAMIN B12 545 pg/mL  100-900     (1) FOLATE [id number] 08:36AM Delle Nones Order Number: [id number]     Test Name Result Flag Reference   FOLATE >20 0 ng/mL H 3 1-17 5     * MRI BRAIN WO CONTRAST [id number] 07:49AM Nathen Mad     Test Name Result Flag Reference   MRI BRAIN WO CONTRAST (Report)     MRI BRAIN WITHOUT CONTRAST     INDICATION: Migraines, 1-2 months     COMPARISON:  None  TECHNIQUE: Sagittal T1, axial T2, axial FLAIR, axial T1, axial Gradient and axial diffusion imaging  IMAGE QUALITY: Diagnostic       FINDINGS:     BRAIN PARENCHYMA: No acute disease  There is no acute ischemia, intracranial mass, mass effect or edema  No indication of pathologic hemosiderin deposition  Tiny nonspecific focus of high signal in the subinsular parenchyma on the left  These have been described in patients with complicated migraine  VENTRICLES: The ventricles are normal in size and contour  SELLA AND PITUITARY GLAND: Normal      ORBITS: Normal      PARANASAL SINUSES: Normal      VASCULATURE: Evaluation of the major intracranial vasculature demonstrates appropriate flow voids  CALVARIUM AND SKULL BASE: Normal      EXTRACRANIAL SOFT TISSUES: Normal        IMPRESSION:     No acute disease  No mass or edema  Tiny, nonspecific solitary focus of high signal subinsular white matter  Workstation performed: [id number]     Signed by:    Gavi Dunham MD   9/26/17

## 2018-01-18 NOTE — MISCELLANEOUS
Message  Message Free Text Note Form: 2016 4:50pm  Patient, Patel Munguia, : 89 missed her follow up scheduled appointment on   16 @ 4:00pm with Dr Vane Gu  The patient and her father arrived later on 16 @ 4:40pm    This is 40 minutes after her scheduled appointment time with the provider  The patient's father demanded that his daughter be seen immediately for a follow up visit,and that she was not having any type of emergent problem  It was explained to both patient and her father that there were patients scheduled for the rest of the day who had reserved a specific time with the provider, that it wouldn't be fair to alter their plans  In addition, we offered to reschedule Rosy's missed appointment to accommodate her schedule  There was no compromising with the father  We explained to the patient's father that if only Jay De La Rosa had called prior to her assigned time, we would have made arrangements so that she could have been seen the same day for her follow up appointment but at a different time  The patient's father became extremely loud and offensive at the   His comments to the MA were condescending, and comments to the provider were profoundly obscene and vulgar, using foul language along with a middle finger jester, enhancing his repulsive behavior  Another appointment was given for the next day and accepted by Jay De La Rosa but Mr Randa Alvarado refused emphatically saying he would go elsewhere and left the building  Unfortunately, this horrific behavior was witnessed by a frightened elderly couple in the waiting room  This type of behavior is unacceptable and unwarranted, as a result the patient will be discharged from our practice effectively immediately      Patel Munguia and her insurance company, will be notified by regular mail and certified mail indicating that starting 17 she will be discharged from this practice, but we will continue to offer her emergency care for the next 30 days  We will encourage her to seek a new PCP within those 30 days, and ask Carolyn Garcia to provide our office with the name and address of her new provider along with an authorization to release her records to her new physician  Once authorization is received, we will forward her medical records to the new office          Signatures   Electronically signed by : Morelia Rodriguez DO; Dec 23 2016  9:36AM EST                       (Author)

## 2018-01-23 VITALS
SYSTOLIC BLOOD PRESSURE: 110 MMHG | HEIGHT: 60 IN | WEIGHT: 113 LBS | BODY MASS INDEX: 22.19 KG/M2 | DIASTOLIC BLOOD PRESSURE: 72 MMHG

## 2018-01-23 NOTE — RESULT NOTES
Discussion/Summary   Let her know that her iron is much better, thyroid is normal   I would still recommend she take some iron for at least 6 months  She must have added some green leafy vegetables and red meat to her diet to get the iron up a little bit       Verified Results  (1) T4, FREE [id number] 03:27PM Shane Marrero     Test Name Result Flag Reference   T4, FREE 1 5 ng/dL  0 8-1 8     (1) CBC/PLT/DIFF [id number] 03:27PM Shane Marrero     Test Name Result Flag Reference   WHITE BLOOD CELL COUNT 7 6 Thousand/uL  3 8-10 8   RED BLOOD CELL COUNT 4 72 Million/uL  3 80-5 10   HEMOGLOBIN 12 1 g/dL  11 7-15 5   HEMATOCRIT 37 0 %  35 0-45 0   MCV 78 4 fL L 80 0-100 0   MCH 25 6 pg L 27 0-33 0   MCHC 32 7 g/dL  32 0-36 0   RDW 14 7 %  11 0-15 0   PLATELET COUNT 550 Thousand/uL  140-400   ABSOLUTE NEUTROPHILS 3200 cells/uL  0407-1806   ABSOLUTE LYMPHOCYTES 3618 cells/uL  850-3900   ABSOLUTE MONOCYTES 608 cells/uL  200-950   ABSOLUTE EOSINOPHILS 106 cells/uL     ABSOLUTE BASOPHILS 68 cells/uL  0-200   NEUTROPHILS 42 1 %     LYMPHOCYTES 47 6 %     MONOCYTES 8 0 %     EOSINOPHILS 1 4 %     BASOPHILS 0 9 %     MPV 11 9 fL  7 5-12 5     (1) IRON [id number] 03:27PM Shane Marrero     Test Name Result Flag Reference   IRON, TOTAL 80 mcg/dL       (Q) TSH, 3RD GENERATION [id number] 03:27PM Shane Marrero     Test Name Result Flag Reference   TSH 0 85 mIU/L     Reference Range                         > or = 20 Years  0 40-4 50                              Pregnancy Ranges            First trimester    0 26-2 66            Second trimester   0 55-2 73            Third trimester    0 43-2 91

## 2018-01-25 ENCOUNTER — OFFICE VISIT (OUTPATIENT)
Dept: NEUROLOGY | Facility: CLINIC | Age: 23
End: 2018-01-25
Payer: COMMERCIAL

## 2018-01-25 VITALS
HEIGHT: 60 IN | BODY MASS INDEX: 21.99 KG/M2 | HEART RATE: 70 BPM | SYSTOLIC BLOOD PRESSURE: 98 MMHG | WEIGHT: 112 LBS | DIASTOLIC BLOOD PRESSURE: 64 MMHG

## 2018-01-25 DIAGNOSIS — G44.219 EPISODIC TENSION-TYPE HEADACHE, NOT INTRACTABLE: Primary | ICD-10-CM

## 2018-01-25 DIAGNOSIS — D50.9 IRON DEFICIENCY ANEMIA, UNSPECIFIED IRON DEFICIENCY ANEMIA TYPE: ICD-10-CM

## 2018-01-25 DIAGNOSIS — F41.8 ANXIETY ASSOCIATED WITH DEPRESSION: ICD-10-CM

## 2018-01-25 DIAGNOSIS — G43.109 OCULAR MIGRAINE: ICD-10-CM

## 2018-01-25 DIAGNOSIS — R79.89 ABNORMAL THYROID STIMULATING HORMONE (TSH) LEVEL: ICD-10-CM

## 2018-01-25 PROBLEM — D64.9 ANEMIA: Status: ACTIVE | Noted: 2017-06-05

## 2018-01-25 PROCEDURE — 99214 OFFICE O/P EST MOD 30 MIN: CPT | Performed by: PSYCHIATRY & NEUROLOGY

## 2018-01-25 RX ORDER — BUTALBITAL, ACETAMINOPHEN AND CAFFEINE 50; 325; 40 MG/1; MG/1; MG/1
1 CAPSULE ORAL AS NEEDED
COMMUNITY
Start: 2017-09-15 | End: 2018-01-25 | Stop reason: SINTOL

## 2018-01-25 NOTE — PROGRESS NOTES
Patient ID: Kinjal Carter is a 20 y o  female  Assessment/Plan:       Diagnoses and all orders for this visit:    Ocular migraine  - Does not disrupt her daily life style  - Tells me opthalmology exam recently including dilated eye exam normal  - Thus no medication for it at this time  - MRI brain essentially normal with a punctate solitary WM change- potentially secondary to tobacco use in the past    Episodic tension-type headache, not intractable  - Essentially resolved since thyroid treatment initiated  - Discussed with her, if these return can try Mag Oxide daily- she is agreeable    Iron deficiency anemia, unspecified iron deficiency anemia type  - On iron supplementation  - Defer to PCP for further recommendations    Abnormal thyroid stimulating hormone (TSH) level  - Defer to PCP    Anxiety associated with depression  - Stable  - Seeing a therapist       She is neurologically stable at this time and thus can follow up with on an as needed basis and she is agreeable with this  Discussed the importance of staying hydrated, not skipping meals, adequate sleep to help with minimizing headaches  Subjective:    HPI    Since last seen for likely tension type headaches likely worse secondary to untreated thyroid disorder, she tells me she is doing well with no headaches since last seen  States she is now on thyroid medication and also taking iron supplementation for anemia    She tells me she still has occasional bright lights and trails lasting for a few seconds or minutes while reading or doing any other activities  States can occur every day  States no vision loss  States has been seen by opthalmology and dilated eye exam normal     States did not take the Mag oxide as she did not have the headaches  Tells me she has stopped smoking but uses the vape- but tells me it has no nicotine in it    States marijuana use- rare use      Objective:    Blood pressure 98/64, pulse 70, height 5' (1 524 m), weight 50 8 kg (112 lb)  Physical Exam   Constitutional: She is oriented to person, place, and time  She appears well-developed and well-nourished  HENT:   Head: Normocephalic and atraumatic  Eyes: EOM are normal  Pupils are equal, round, and reactive to light  Neck: Normal range of motion  Cardiovascular: Normal rate and regular rhythm  Pulmonary/Chest: Effort normal    Abdominal: Soft  Musculoskeletal: She exhibits no tenderness  Neurological: She is alert and oriented to person, place, and time  She has normal strength and normal reflexes  Gait normal        Neurological Exam    Mental Status  The patient is alert and oriented to person, place, time, and situation  Her recent and remote memory are normal  She has no dysarthria  She is able to name object, read and repeat  She has normal attention span and concentration  She follows multi-step commands  She has a normal fund of knowledge  Cranial Nerves    CN II: The patient's visual acuity and visual fields are normal   CN III, IV, VI: The patient's pupils are equally round and reactive to light and ocular movements are normal   CN V: The patient has normal facial sensation  CN VII:  The patient has symmetric facial movement  CN VIII:  The patient's hearing is normal   CN IX, X: The patient has symmetric palate movement and normal gag reflex  CN XI: The patient's shoulder shrug strength is normal   CN XII: The patient's tongue is midline without atrophy or fasciculations  Motor  The patient has normal muscle bulk throughout  Her overall muscle tone is normal throughout  Her strength is 5/5 throughout all four extremities  Sensory  The patient's sensation is normal in all four extremities to light touch, temperature and vibration  She has no right-sided and no left-sided hemispatial neglect  Reflexes  Deep tendon reflexes are 2+ and symmetric in all four extremities with downgoing toes bilaterally      Gait and Coordination  The patient has normal gait and station  She has normal right heel to shin and normal left heel to shin coordination  She has normal right finger to nose and normal left finger to nose coordination  ROS:    Review of Systems   Constitutional: Negative  HENT: Negative  Eyes: Positive for visual disturbance  Respiratory: Negative  Cardiovascular: Negative  Gastrointestinal: Negative  Endocrine: Negative  Genitourinary: Negative  Musculoskeletal: Negative  Skin: Negative  Allergic/Immunologic: Negative  Neurological: Positive for headaches  Hematological: Negative  Psychiatric/Behavioral: Negative

## 2018-06-01 ENCOUNTER — HOSPITAL ENCOUNTER (EMERGENCY)
Facility: HOSPITAL | Age: 23
Discharge: HOME | End: 2018-06-01
Attending: EMERGENCY MEDICINE | Admitting: EMERGENCY MEDICINE
Payer: COMMERCIAL

## 2018-06-01 ENCOUNTER — APPOINTMENT (EMERGENCY)
Dept: RADIOLOGY | Facility: HOSPITAL | Age: 23
End: 2018-06-01
Payer: COMMERCIAL

## 2018-06-01 VITALS
HEART RATE: 75 BPM | BODY MASS INDEX: 22.46 KG/M2 | OXYGEN SATURATION: 98 % | TEMPERATURE: 98.4 F | SYSTOLIC BLOOD PRESSURE: 110 MMHG | WEIGHT: 115 LBS | DIASTOLIC BLOOD PRESSURE: 66 MMHG | RESPIRATION RATE: 18 BRPM

## 2018-06-01 DIAGNOSIS — S92.255A CLOSED NONDISPLACED FRACTURE OF NAVICULAR BONE OF LEFT FOOT, INITIAL ENCOUNTER: Primary | ICD-10-CM

## 2018-06-01 PROCEDURE — 73610 X-RAY EXAM OF ANKLE: CPT

## 2018-06-01 PROCEDURE — 99283 EMERGENCY DEPT VISIT LOW MDM: CPT

## 2018-06-01 PROCEDURE — 73630 X-RAY EXAM OF FOOT: CPT

## 2018-06-01 RX ORDER — HYDROCODONE BITARTRATE AND ACETAMINOPHEN 5; 325 MG/1; MG/1
1 TABLET ORAL EVERY 6 HOURS PRN
Qty: 10 TABLET | Refills: 0 | Status: SHIPPED | OUTPATIENT
Start: 2018-06-01 | End: 2018-08-16

## 2018-06-01 RX ORDER — NAPROXEN 500 MG/1
500 TABLET ORAL 2 TIMES DAILY WITH MEALS
Qty: 30 TABLET | Refills: 0 | Status: SHIPPED | OUTPATIENT
Start: 2018-06-01 | End: 2018-08-16

## 2018-06-01 NOTE — DISCHARGE INSTRUCTIONS
Foot Fracture in Adults   WHAT YOU NEED TO KNOW:   A foot fracture is a break in one or more of the bones in your foot  Foot fractures are commonly caused by trauma, falls, or repeated stress injuries  DISCHARGE INSTRUCTIONS:   Medicines:   · Antibiotics: This medicine is given to help treat or prevent an infection caused by bacteria  · NSAIDs:  These medicines decrease swelling and pain  NSAIDs are available without a doctor's order  Ask which medicine is right for you  Ask how much to take and when to take it  Take as directed  NSAIDs can cause stomach bleeding and kidney problems if not taken correctly  · Pain medicine: You may be given a prescription medicine to decrease pain  Do not wait until the pain is severe before you take this medicine  · Take your medicine as directed  Contact your healthcare provider if you think your medicine is not helping or if you have side effects  Tell him of her if you are allergic to any medicine  Keep a list of the medicines, vitamins, and herbs you take  Include the amounts, and when and why you take them  Bring the list or the pill bottles to follow-up visits  Carry your medicine list with you in case of an emergency  Follow up with your healthcare provider or bone specialist as directed: You may need to return to have your splint or stitches removed  You may also need to return for tests to make sure your foot is healing  Write down your questions so you remember to ask them during your visits  Wound care:  Carefully wash the wound with soap and water  Dry the area and put on new, clean bandages as directed  Change your bandages when they get wet or dirty  Self-care:   · Rest:  You may need to rest your foot and avoid activities that cause pain  For stress fractures, you will need to avoid the activity that caused the fracture until it heals  Ask when you can return to your normal activities such as work and sports      · Ice:  Ice helps decrease swelling and pain  Ice may also help prevent tissue damage  Use an ice pack or put crushed ice in a plastic bag  Cover it with a towel, and place it on your foot for 15 to 20 minutes every hour as directed  · Elevate your foot:  Raise your foot at or above the level of your heart as often as you can  This will help decrease swelling and pain  Prop your foot on pillows or blankets to keep it elevated comfortably  · Physical therapy: Once your foot has healed, a physical therapist can teach you exercises to help improve movement and strength, and to decrease pain  Splint care:   · Check the skin around your splint daily for any redness or open areas  · Do not use a sharp or pointed object to scratch your skin under the splint  · Do not remove your splint unless your healthcare provider or orthopedic surgeon says it is okay  Bathing with a splint:  Do not let your splint get wet  Before bathing, cover the splint with a plastic bag  Tape the bag to your skin above the splint to seal out the water  Keep your foot out of the water in case the bag leaks  Ask when it is okay to take a bath or shower  Assistive devices: You may be given a hard-soled shoe to wear while your foot is healing  You also may need to use crutches to help you walk while your foot heals  It is important to use your crutches correctly  Ask for more information about how to use crutches  Contact your healthcare provider or bone specialist if:   · You have a fever  · You have new sores around your boot or splint  · You have new or worsening trouble moving your foot  · You notice a foul smell coming from under your splint  · Your boot or splint gets damaged  · You have questions or concerns about your condition or care  Return to the emergency department if:   · The pain in your injured foot gets worse even after you rest and take pain medicine      · The skin or toes of your foot become numb, swollen, cold, white, or blue     · You have more pain or swelling than you did before the splint was put on  · Your wound is draining fluid or pus  · Blood soaks through your bandage  · Your leg feels warm, tender, and painful  It may look swollen and red  · You suddenly feel lightheaded and short of breath  · You have chest pain when you take a deep breath or cough  You may cough up blood  © 2017 [employer] Information is for End User's use only and may not be sold, redistributed or otherwise used for commercial purposes  All illustrations and images included in CareNotes® are the copyrighted property of A [employer]  or [employer]  The above information is an  only  It is not intended as medical advice for individual conditions or treatments  Talk to your doctor, nurse or pharmacist before following any medical regimen to see if it is safe and effective for you  DISCHARGE INSTRUCTIONS:    FOLLOW UP WITH YOUR PRIMARY CARE PROVIDER OR THE [address] [place] [address]  MAKE AN APPOINTMENT TO BE SEEN  TAKE NAPROXEN AS PRESCRIBED FOR PAIN AND INFLAMMATION  IF RASH, SHORTNESS OF BREATH OR TROUBLE SWALLOWING, STOP TAKING THE MEDICATION AND BE SEEN  TAKE NORCO AS PRESCRIBED FOR MODERATE PAIN  IF RASH, SHORTNESS OF BREATH OR TROUBLE SWALLOWING, STOP TAKING THE MEDICATION AND BE SEEN  NO DRINKING, DRIVING OR OPERATING HEAVY MACHINERY WHILE TAKING THIS MEDICATION  FOLLOW UP WITH THE RECOMMENDED ORTHOPEDIC SPECIALIST  MAKE AN APPOINTMENT TO BE SEEN  REST, ICE AND ELEVATE THE AREA  WEAR THE SPLINT UNTIL SEEN BY THE RECOMMENDED ORTHOPEDIC SPECIALIST  DO NOT GET THE SPLINT WET  DO NOT TAKE THE SPLINT OFF  IF SYMPTOMS WORSEN OR NEW SYMPTOMS ARISE, RETURN TO THE ER TO BE SEEN

## 2018-06-01 NOTE — ED PROVIDER NOTES
History  Chief Complaint   Patient presents with    Ankle Injury     patient c/o of left ankle pain after jumping;     22y  o female with PMH of hypothyroid, anxiety, depression and anemia presents to the ER for left ankle and foot pain for 1 day  Patient states she was jumping off a step when she landed wrong  She denies taking any medication for pain  She describes her pain as throbbing and radiating between her foot and ankle  She rates her pain 5/10 and states it comes and goes with bearing weight  She denies fever, chills, chest pain, dyspnea, N/V/D, abdominal pain, weakness or paresthesias  History provided by:  Patient   used: No        Prior to Admission Medications   Prescriptions Last Dose Informant Patient Reported? Taking?   ondansetron (ZOFRAN-ODT) 4 mg disintegrating tablet   No No   Sig: Take 1 tablet by mouth every 8 (eight) hours as needed for nausea or vomiting for up to 30 days      Facility-Administered Medications: None       Past Medical History:   Diagnosis Date    Psychiatric disorder     UTI (urinary tract infection)        History reviewed  No pertinent surgical history  History reviewed  No pertinent family history  I have reviewed and agree with the history as documented  Social History   Substance Use Topics    Smoking status: Current Every Day Smoker     Packs/day: 1 00     Types: Cigarettes    Smokeless tobacco: Never Used    Alcohol use Yes      Comment: rarely        Review of Systems   Constitutional: Negative for chills and fever  Eyes: Negative for redness  Respiratory: Negative for shortness of breath  Cardiovascular: Negative for chest pain  Gastrointestinal: Negative for abdominal pain, diarrhea, nausea and vomiting  Musculoskeletal: Positive for joint swelling (left foot)  Negative for neck stiffness  Skin: Negative for rash  Allergic/Immunologic: Negative for food allergies     Neurological: Negative for weakness and numbness  Physical Exam  Physical Exam   Constitutional:  Non-toxic appearance  No distress  HENT:   Head: Normocephalic and atraumatic  Right Ear: Tympanic membrane, external ear and ear canal normal  No drainage, swelling or tenderness  No foreign bodies  Tympanic membrane is not erythematous  No hemotympanum  Left Ear: Tympanic membrane, external ear and ear canal normal  No drainage, swelling or tenderness  No foreign bodies  Tympanic membrane is not erythematous  No hemotympanum  Nose: Nose normal    Mouth/Throat: Uvula is midline, oropharynx is clear and moist and mucous membranes are normal  No uvula swelling  No posterior oropharyngeal edema, posterior oropharyngeal erythema or tonsillar abscesses  No tonsillar exudate  Neck: Normal range of motion and phonation normal  Neck supple  No tracheal deviation present  Cardiovascular: Normal rate, regular rhythm, S1 normal, S2 normal and normal heart sounds  Exam reveals no gallop and no friction rub  No murmur heard  Pulmonary/Chest: Effort normal and breath sounds normal  No respiratory distress  She has no decreased breath sounds  She has no wheezes  She has no rhonchi  She has no rales  She exhibits no tenderness  Musculoskeletal:        Left ankle: She exhibits decreased range of motion, swelling and ecchymosis  She exhibits no deformity, no laceration and normal pulse  Tenderness  Lateral malleolus tenderness found  Left lower leg: Normal         Left foot: There is tenderness, bony tenderness and swelling  There is normal range of motion, normal capillary refill, no crepitus, no deformity and no laceration  Feet:    Neurological: She is alert  GCS eye subscore is 4  GCS verbal subscore is 5  GCS motor subscore is 6  Skin: Skin is warm and dry  No rash noted  Psychiatric: She has a normal mood and affect  Nursing note and vitals reviewed        Vital Signs  ED Triage Vitals   Temperature Pulse Respirations Blood Pressure SpO2   06/01/18 1811 06/01/18 1811 06/01/18 1811 06/01/18 1812 06/01/18 1811   98 4 °F (36 9 °C) 75 18 110/66 98 %      Temp Source Heart Rate Source Patient Position - Orthostatic VS BP Location FiO2 (%)   06/01/18 1811 06/01/18 1811 06/01/18 1811 06/01/18 1811 --   Temporal Monitor Sitting Right arm       Pain Score       --                  Vitals:    06/01/18 1811 06/01/18 1812   BP:  110/66   Pulse: 75    Patient Position - Orthostatic VS: Sitting        Visual Acuity      ED Medications  Medications - No data to display    Diagnostic Studies  Results Reviewed     None                 XR foot 3+ views LEFT   ED Interpretation by Hayden Molina PA-C (06/01 1852)   Possible avulsion fracture of the navicular seen by me at this time  Final Result by Sarai Liang MD (06/01 1904)      Minimally displaced avulsion fracture at the dorsal navicular bone  Findings concur with ED results as provided in PACS viewer/EPIC at the time of interpretation  Workstation performed: [id number]         XR ankle 3+ views LEFT   ED Interpretation by Hayden Molina PA-C (06/01 1852)   Possible avulsion fracture of the navicular seen by me at this time  Final Result by Sarai Liang MD (06/01 1903)      Tiny avulsion fracture at the dorsal navicular bone  Intact ankle mortise  Workstation performed: [id number]                    Procedures  Orthopedic Injury  Date/Time: 6/1/2018 7:00 PM  Performed by: Reid Anguiano by: Humberto Tomas   Consent: Verbal consent obtained    Consent given by: patient  Patient understanding: patient states understanding of the procedure being performed  Imaging studies: imaging studies available  Patient identity confirmed: arm band  Injury location: foot  Location details: left foot  Injury type: fracture  Fracture type: navicular  Pre-procedure neurovascular assessment: neurovascularly intact  Pre-procedure distal perfusion: normal  Pre-procedure neurological function: normal  Pre-procedure range of motion: normal    Anesthesia:  Local anesthesia used: no  General Anesthesia used: noManipulation performed: no  Immobilization: splint and crutches  Splint type: short leg  Supplies used: cotton padding,  elastic bandage and Ortho-Glass  Post-procedure neurovascular assessment: post-procedure neurovascularly intact  Post-procedure distal perfusion: normal  Post-procedure neurological function: normal  Post-procedure range of motion: normal  Patient tolerance: Patient tolerated the procedure well with no immediate complications             Phone Contacts  ED Phone Contact    ED Course                               MDM  Number of Diagnoses or Management Options  Closed nondisplaced fracture of navicular bone of left foot, initial encounter: new and requires workup  Diagnosis management comments: DDX consists of but not limited to: fracture, contusion, dislocation, sprain    Will xray the foot and ankle to r/o fracture  At discharge, I instructed the patient to:  -follow up with pcp  -take Naproxen as prescribed for pain and inflammation  -take Norco as prescribed for moderate pain  -follow up with the recommended orthopedic specialist  -rest, ice and elevate the area  -wear the splint until seen by orthopedics  Do not take off or get wet  -return to the ER if symptoms worsened or new symptoms arose  Patient agreed to this plan and was stable at time of discharge         Amount and/or Complexity of Data Reviewed  Tests in the radiology section of CPT®: ordered and reviewed  Independent visualization of images, tracings, or specimens: yes    Patient Progress  Patient progress: stable    CritCare Time    Disposition  Final diagnoses:   Closed nondisplaced fracture of navicular bone of left foot, initial encounter     Time reflects when diagnosis was documented in both MDM as applicable and the Disposition within this note     Time User Action Codes Description Comment    6/1/2018  6:56 PM Alvarado Gautam A Add [G26 [id number]] Closed nondisplaced fracture of navicular bone of left foot, initial encounter       ED Disposition     ED Disposition Condition Comment    Discharge  [facility] discharge to home/self care  Condition at discharge: Stable        Follow-up Information     Follow up With Specialties Details Why Contact Info    Srinivas Acevedo DO Family Medicine Schedule an appointment as soon as possible for a visit in 1 day  [address]      Deb Amezcua DPM Podiatry Schedule an appointment as soon as possible for a visit in 1 day Left navicular fracture [address] [place] [address]  [phone]            Patient's Medications   Discharge Prescriptions    HYDROCODONE-ACETAMINOPHEN (NORCO) 5-325 MG PER TABLET    Take 1 tablet by mouth every 6 (six) hours as needed for pain Max Daily Amount: 4 tablets       Start Date: 6/1/2018  End Date: --       Order Dose: 1 tablet       Quantity: 10 tablet    Refills: 0    NAPROXEN (NAPROSYN) 500 MG TABLET    Take 1 tablet (500 mg total) by mouth 2 (two) times a day with meals       Start Date: 6/1/2018  End Date: --       Order Dose: 500 mg       Quantity: 30 tablet    Refills: 0     No discharge procedures on file      ED Provider  Electronically Signed by           Carlos Mckeon PA-C  06/01/18 5614

## 2018-06-04 DIAGNOSIS — E03.9 ACQUIRED HYPOTHYROIDISM: Primary | ICD-10-CM

## 2018-06-04 PROBLEM — R79.89 ABNORMAL THYROID STIMULATING HORMONE (TSH) LEVEL: Status: RESOLVED | Noted: 2018-01-25 | Resolved: 2018-06-04

## 2018-06-04 RX ORDER — LEVOTHYROXINE SODIUM 0.05 MG/1
50 TABLET ORAL DAILY
Qty: 90 TABLET | Refills: 0 | Status: SHIPPED | OUTPATIENT
Start: 2018-06-04 | End: 2019-01-03 | Stop reason: SDUPTHER

## 2018-06-04 RX ORDER — LEVOTHYROXINE SODIUM 0.05 MG/1
1 TABLET ORAL DAILY
COMMUNITY
Start: 2017-10-09 | End: 2018-06-04 | Stop reason: SDUPTHER

## 2018-08-16 ENCOUNTER — HOSPITAL ENCOUNTER (EMERGENCY)
Facility: HOSPITAL | Age: 23
Discharge: HOME | End: 2018-08-16
Admitting: EMERGENCY MEDICINE
Payer: COMMERCIAL

## 2018-08-16 ENCOUNTER — APPOINTMENT (EMERGENCY)
Dept: RADIOLOGY | Facility: HOSPITAL | Age: 23
End: 2018-08-16
Payer: COMMERCIAL

## 2018-08-16 VITALS
OXYGEN SATURATION: 95 % | DIASTOLIC BLOOD PRESSURE: 65 MMHG | BODY MASS INDEX: 21.48 KG/M2 | HEART RATE: 95 BPM | WEIGHT: 110 LBS | RESPIRATION RATE: 16 BRPM | SYSTOLIC BLOOD PRESSURE: 121 MMHG | TEMPERATURE: 98.4 F

## 2018-08-16 DIAGNOSIS — S60.221A CONTUSION OF RIGHT HAND, INITIAL ENCOUNTER: Primary | ICD-10-CM

## 2018-08-16 DIAGNOSIS — M79.641 RIGHT HAND PAIN: ICD-10-CM

## 2018-08-16 PROCEDURE — 73130 X-RAY EXAM OF HAND: CPT

## 2018-08-16 PROCEDURE — 99283 EMERGENCY DEPT VISIT LOW MDM: CPT

## 2018-08-16 RX ORDER — MULTIVIT-MIN/IRON FUM/FOLIC AC 7.5 MG-4
1 TABLET ORAL DAILY
COMMUNITY

## 2018-08-16 RX ORDER — IBUPROFEN 600 MG/1
600 TABLET ORAL ONCE
Status: COMPLETED | OUTPATIENT
Start: 2018-08-16 | End: 2018-08-16

## 2018-08-16 RX ADMIN — IBUPROFEN 600 MG: 600 TABLET, FILM COATED ORAL at 11:23

## 2018-08-16 NOTE — ED PROVIDER NOTES
History  Chief Complaint   Patient presents with    Hand Injury     This am, pt  accidently punched a wall with her right hand  20 y o  Female presents with right hand pain after punching a wall  Notes while "play wrestling" with her boyfriend she went to punch his arm he moved and she struck the wall behind him  Denies numbness, tingling, loss of strength, or dexterity  Able to wiggle fingers, hold a pen            Prior to Admission Medications   Prescriptions Last Dose Informant Patient Reported? Taking? Multiple Vitamins-Minerals (MULTIVITAMIN WITH MINERALS) tablet   Yes Yes   Sig: Take 1 tablet by mouth daily   levothyroxine 50 mcg tablet   No Yes   Sig: Take 1 tablet (50 mcg total) by mouth daily      Facility-Administered Medications: None       Past Medical History:   Diagnosis Date    Abnormal TSH 09/15/2017    Last assessed    Anxiety     Depression     Disease of thyroid gland     Mental health problem 10/16/2017    Last assessed    Psychiatric disorder     UTI (urinary tract infection)        History reviewed  No pertinent surgical history  Family History   Problem Relation Age of Onset    Alcohol abuse Mother     Cirrhosis Mother     Hypertension Father     Alcohol abuse Maternal Grandfather     Cirrhosis Maternal Grandfather      I have reviewed and agree with the history as documented  Social History   Substance Use Topics    Smoking status: Current Every Day Smoker     Packs/day: 1 00     Types: Cigarettes    Smokeless tobacco: Never Used    Alcohol use Yes      Comment: rarely        Review of Systems   Musculoskeletal: Positive for arthralgias, joint swelling and myalgias  All other systems reviewed and are negative  Physical Exam  Physical Exam   Constitutional: She is oriented to person, place, and time  She appears well-developed and well-nourished  HENT:   Head: Normocephalic and atraumatic     Right Ear: External ear normal    Left Ear: External ear normal    Nose: Nose normal    Mouth/Throat: Oropharynx is clear and moist    Eyes: Conjunctivae are normal    Neck: Neck supple  Cardiovascular: Normal rate and regular rhythm  Pulmonary/Chest: Effort normal and breath sounds normal    Abdominal: Soft  Musculoskeletal: She exhibits edema and tenderness  Right hand: She exhibits decreased range of motion, tenderness, bony tenderness and swelling  She exhibits normal two-point discrimination, normal capillary refill, no deformity and no laceration  Normal sensation noted  Normal strength noted  Hands:  Neurological: She is alert and oriented to person, place, and time  Skin: Skin is warm  Capillary refill takes less than 2 seconds  Psychiatric: She has a normal mood and affect  Her behavior is normal    Nursing note and vitals reviewed        Vital Signs  ED Triage Vitals [08/16/18 1031]   Temperature Pulse Respirations Blood Pressure SpO2   98 4 °F (36 9 °C) 95 16 121/65 95 %      Temp Source Heart Rate Source Patient Position - Orthostatic VS BP Location FiO2 (%)   Oral -- Sitting Left arm --      Pain Score       4           Vitals:    08/16/18 1031   BP: 121/65   Pulse: 95   Patient Position - Orthostatic VS: Sitting       Visual Acuity      ED Medications  Medications   ibuprofen (MOTRIN) tablet 600 mg (600 mg Oral Given 8/16/18 1123)       Diagnostic Studies  Results Reviewed     None                 XR hand 3+ views RIGHT    (Results Pending)              Procedures  Procedures       Phone Contacts  ED Phone Contact    ED Course                               MDM  Number of Diagnoses or Management Options  Contusion of right hand, initial encounter: new and requires workup  Right hand pain: new and requires workup     Amount and/or Complexity of Data Reviewed  Tests in the radiology section of CPT®: ordered and reviewed      CritCare Time    Disposition  Final diagnoses:   Contusion of right hand, initial encounter   Right hand pain Time reflects when diagnosis was documented in both MDM as applicable and the Disposition within this note     Time User Action Codes Description Comment    8/16/2018 11:17 AM Lupepop Bry Add [[id number]] Contusion of right hand, initial encounter     8/16/2018 11:18 AM Lorenza Londono Add [[id number]] Right hand pain       ED Disposition     ED Disposition Condition Comment    Discharge  Tani Howell discharge to home/self care  Condition at discharge:stable        Follow-up Information     Follow up With Specialties Details Why Contact Info    Tierra Davalos DO Family Medicine Schedule an appointment as soon as possible for a visit  [address] [place] [address]      Disha Landon MD Orthopedic Surgery Schedule an appointment as soon as possible for a visit If symptoms worsen, As needed [address] [facility]  [phone]            Discharge Medication List as of 8/16/2018 11:19 AM      CONTINUE these medications which have NOT CHANGED    Details   levothyroxine 50 mcg tablet Take 1 tablet (50 mcg total) by mouth daily, Starting Mon 6/4/2018, Normal      Multiple Vitamins-Minerals (MULTIVITAMIN WITH MINERALS) tablet Take 1 tablet by mouth daily, Historical Med           No discharge procedures on file      ED Provider  Electronically Signed by           Jono Jones PA-C  08/16/18 5737

## 2018-08-16 NOTE — DISCHARGE INSTRUCTIONS
Contusion in Adults   WHAT YOU NEED TO KNOW:   A contusion is a bruise that appears on your skin after an injury  A bruise happens when small blood vessels tear but skin does not  When blood vessels tear, blood leaks into nearby tissue, such as soft tissue or muscle  DISCHARGE INSTRUCTIONS:   Return to the emergency department if:   · You have new trouble moving the injured area  · You have tingling or numbness in or near the injured area  · Your hand or foot below the bruise gets cold or turns pale  Contact your healthcare provider if:   · You find a new lump in the injured area  · Your symptoms do not improve with treatment after 4 to 5 days  · You have questions or concerns about your condition or care  Medicines: You may need any of the following:  · NSAIDs  help decrease swelling and pain or fever  This medicine is available with or without a doctor's order  NSAIDs can cause stomach bleeding or kidney problems in certain people  If you take blood thinner medicine, always ask your healthcare provider if NSAIDs are safe for you  Always read the medicine label and follow directions  · Prescription pain medicine  may be given  Do not wait until the pain is severe before you take your medicine  · Take your medicine as directed  Contact your healthcare provider if you think your medicine is not helping or if you have side effects  Tell him of her if you are allergic to any medicine  Keep a list of the medicines, vitamins, and herbs you take  Include the amounts, and when and why you take them  Bring the list or the pill bottles to follow-up visits  Carry your medicine list with you in case of an emergency  Follow up with your healthcare provider as directed: You may need to return within a week to check your injury again  Write down your questions so you remember to ask them during your visits    Help a contusion heal:   · Rest the injured area  or use it less than usual  If you bruised your leg or foot, you may need crutches or a cane to help you walk  This will help you keep weight off your injured body part  · Apply ice  to decrease swelling and pain  Ice may also help prevent tissue damage  Use an ice pack, or put crushed ice in a plastic bag  Cover it with a towel and place it on your bruise for 15 to 20 minutes every hour or as directed  · Use compression  to support the area and decrease swelling  Wrap an elastic bandage around the area over the bruised muscle  Make sure the bandage is not too tight  You should be able to fit 1 finger between the bandage and your skin  · Elevate (raise) your injured body part  above the level of your heart to help decrease pain and swelling  Use pillows, blankets, or rolled towels to elevate the area as often as you can  · Do not drink alcohol  as directed  Alcohol may slow healing  · Do not stretch injured muscles  right after your injury  Ask your healthcare provider when and how you may safely stretch after your injury  Gentle stretches can help increase your flexibility  · Do not massage the area or put heating pads  on the bruise right after your injury  Heat and massage may slow healing  Your healthcare provider may tell you to apply heat after several days  At that time, heat will start to help the injury heal   Prevent another contusion:   · Stretch and warm up before you play sports or exercise  · Wear protective gear when you play sports  Examples are shin guards and padding  · If you begin a new physical activity, start slowly to give your body a chance to adjust   © 2017 [employer] Information is for End User's use only and may not be sold, redistributed or otherwise used for commercial purposes  All illustrations and images included in CareNotes® are the copyrighted property of [employer]  or Alan Jovel  The above information is an  only   It is not intended as medical advice for individual conditions or treatments  Talk to your doctor, nurse or pharmacist before following any medical regimen to see if it is safe and effective for you  Arthralgia   WHAT YOU NEED TO KNOW:   Arthralgia is pain in one or more joints, with no inflammation  It may be short-term and get better within 6 to 8 weeks  Arthralgia can be an early sign of arthritis  Arthralgia may be caused by a medical condition, such as a hormone disorder or a tumor  It may also be caused by an infection or injury  DISCHARGE INSTRUCTIONS:   Medicines: The following medicines may  be ordered for you:  · Acetaminophen  decreases pain  Ask how much to take and how often to take it  Follow directions  Acetaminophen can cause liver damage if not taken correctly  · NSAIDs  decrease pain and prevent swelling  Ask your healthcare provider which medicine is right for you  Ask how much to take and when to take it  Take as directed  NSAIDs can cause stomach bleeding and kidney problems if not taken correctly  · Pain relief cream  decreases pain  Use this cream as directed  · Take your medicine as directed  Contact your healthcare provider if you think your medicine is not helping or if you have side effects  Tell him of her if you are allergic to any medicine  Keep a list of the medicines, vitamins, and herbs you take  Include the amounts, and when and why you take them  Bring the list or the pill bottles to follow-up visits  Carry your medicine list with you in case of an emergency  Follow up with your healthcare provider or specialist as directed:  Write down your questions so you remember to ask them during your visits  Self-care:   · Apply heat  to help decrease pain  Use a heating pad or heat wrap  Apply heat for 20 to 30 minutes every 2 hours for as many days as directed  · Rest  as much as possible  Avoid activities that cause joint pain  · Apply ice  to help decrease swelling and pain   Ice may also help prevent tissue damage  Use an ice pack, or put crushed ice in a plastic bag  Cover it with a towel and place it on your painful joint for 15 to 20 minutes every hour or as directed  · Support  the joint with a brace or elastic wrap as directed  · Elevate  your joint above the level of your heart as often as you can to help decrease swelling and pain  Prop your painful joint on pillows or blankets to keep it elevated comfortably  · Lose weight  if you are overweight  Extra weight can put pressure on your joints and cause more pain  Ask your healthcare provider how much you should weigh  Ask him to help you create a weight loss plan  · Exercise  regularly to help improve joint movement and to decrease pain  Ask about the best exercise plan for you  Low-impact exercises can help take the pressure off your joints  Examples are walking, swimming, and water aerobics  Physical therapy:  A physical therapist teaches you exercises to help improve movement and strength, and to decrease pain  Ask your healthcare provider if physical therapy is right for you  Contact your healthcare provider or specialist if:   · You have a fever  · You continue to have joint pain that cannot be relieved with heat, ice, or medicine  · You have pain and inflammation around your joint  · You have questions or concerns about your condition or care  Return to the emergency department if:   · You have sudden, severe pain when you move your joint  · You have a fever and shaking chills  · You cannot move your joint  · You lose feeling on the side of your body where you have the painful joint  © 2017 [employer] Information is for End User's use only and may not be sold, redistributed or otherwise used for commercial purposes  All illustrations and images included in CareNotes® are the copyrighted property of [employer]  or Alan Jovel    The above information is an  only  It is not intended as medical advice for individual conditions or treatments  Talk to your doctor, nurse or pharmacist before following any medical regimen to see if it is safe and effective for you

## 2019-01-03 DIAGNOSIS — E03.9 ACQUIRED HYPOTHYROIDISM: ICD-10-CM

## 2019-01-03 DIAGNOSIS — E03.9 ACQUIRED HYPOTHYROIDISM: Primary | ICD-10-CM

## 2019-01-03 DIAGNOSIS — D50.9 IRON DEFICIENCY ANEMIA, UNSPECIFIED IRON DEFICIENCY ANEMIA TYPE: ICD-10-CM

## 2019-01-03 RX ORDER — PAROXETINE HYDROCHLORIDE 20 MG/1
TABLET, FILM COATED ORAL
COMMUNITY

## 2019-01-03 RX ORDER — RIZATRIPTAN BENZOATE 10 MG/1
TABLET, ORALLY DISINTEGRATING ORAL
COMMUNITY

## 2019-01-03 RX ORDER — LEVOTHYROXINE SODIUM 0.05 MG/1
TABLET ORAL
Qty: 30 TABLET | Refills: 0 | Status: SHIPPED | OUTPATIENT
Start: 2019-01-03 | End: 2019-03-21 | Stop reason: SDUPTHER

## 2019-03-16 DIAGNOSIS — E03.9 ACQUIRED HYPOTHYROIDISM: ICD-10-CM

## 2019-03-17 RX ORDER — LEVOTHYROXINE SODIUM 0.05 MG/1
50 TABLET ORAL DAILY
Qty: 30 TABLET | Refills: 0 | OUTPATIENT
Start: 2019-03-17

## 2019-03-21 ENCOUNTER — TELEPHONE (OUTPATIENT)
Dept: FAMILY MEDICINE CLINIC | Facility: CLINIC | Age: 24
End: 2019-03-21

## 2019-03-21 DIAGNOSIS — E03.9 ACQUIRED HYPOTHYROIDISM: ICD-10-CM

## 2019-03-21 RX ORDER — LEVOTHYROXINE SODIUM 0.05 MG/1
50 TABLET ORAL DAILY
Qty: 30 TABLET | Refills: 0 | Status: SHIPPED | OUTPATIENT
Start: 2019-03-21

## 2025-01-29 NOTE — RESULT NOTES
Discussion/Summary   She did have a urinary tract infection  It was sensitive to the antibiotic I gave her  She should finish the medication and follow-up with urology  Verified Results  (Q) CULTURE, URINE, SPECIAL [id number] 12:00AM [employer]     Test Name Result Flag Reference   CULTURE, URINE, SPECIAL  A    CULTURE, URINE, SPECIAL         MICRO NUMBER:      [id number]    TEST STATUS:       FINAL    SPECIMEN SOURCE:   URINE    SPECIMEN QUALITY:  ADEQUATE    RESULT:            Greater than 100,000 CFU/mL of Escherichia coli                            E coli                            ----------------                            INT   LASHAUN     AMOX/CLAVULANATE       S     <=2 0     AMPICILLIN             S     <=2 0     AMP/SULBACTAM          S     <=2 0     CEFAZOLIN              NR    <=4 0 **1     CEFEPIME               S     <=1 0     CEFTRIAXONE            S     <=1 0     CIPROFLOXACIN          S     <=0 25     ERTAPENEM              S     <=0 5     GENTAMICIN             S     <=1 0     IMIPENEM               S     <=0 25     LEVOFLOXACIN           S     <=0 12     NITROFURANTOIN         S     <=16 0     PIP/TAZOBACTAM         S     <=4 0     TOBRAMYCIN             S     <=1 0     TRIMETHOPRIM/SULFA     S     <=20 0  S=Susceptible  I=Intermediate  R=Resistant  * = Not Tested  NR = Not Reported  **NN = See Therapy Comments  THERAPY COMMENTS      Note 1:      ORAL therapy: A cefazolin LASHAUN of < 32 predicts      susceptibility to the oral agents cefaclor,      cefdinir, cefpodoxime, cefprozil, cefuroxime,      cephalexin, and loracarbef when used for therapy      of uncomplicated UTIs due to E  coli,      K  pneumoniae, and P  mirabilis  PARENTERAL therapy: A cefazolin LASHAUN of > 8      indicates resistance to parenteral cefazolin  An alternate test method must be performed to      to confirm susceptibility to parenteral cefazolin  good, to achieve stated therapy goals good, to achieve stated therapy goals